# Patient Record
Sex: FEMALE | Race: WHITE | NOT HISPANIC OR LATINO
[De-identification: names, ages, dates, MRNs, and addresses within clinical notes are randomized per-mention and may not be internally consistent; named-entity substitution may affect disease eponyms.]

---

## 2017-01-08 ENCOUNTER — TRANSCRIPTION ENCOUNTER (OUTPATIENT)
Age: 82
End: 2017-01-08

## 2017-01-08 ENCOUNTER — RX RENEWAL (OUTPATIENT)
Age: 82
End: 2017-01-08

## 2017-02-06 ENCOUNTER — MEDICATION RENEWAL (OUTPATIENT)
Age: 82
End: 2017-02-06

## 2017-02-06 ENCOUNTER — RX RENEWAL (OUTPATIENT)
Age: 82
End: 2017-02-06

## 2017-02-06 ENCOUNTER — TRANSCRIPTION ENCOUNTER (OUTPATIENT)
Age: 82
End: 2017-02-06

## 2017-02-15 ENCOUNTER — APPOINTMENT (OUTPATIENT)
Dept: FAMILY MEDICINE | Facility: CLINIC | Age: 82
End: 2017-02-15

## 2017-02-15 VITALS — DIASTOLIC BLOOD PRESSURE: 74 MMHG | SYSTOLIC BLOOD PRESSURE: 120 MMHG | RESPIRATION RATE: 18 BRPM | HEART RATE: 74 BPM

## 2017-02-15 DIAGNOSIS — R94.31 ABNORMAL ELECTROCARDIOGRAM [ECG] [EKG]: ICD-10-CM

## 2017-02-15 DIAGNOSIS — M19.041 PRIMARY OSTEOARTHRITIS, RIGHT HAND: ICD-10-CM

## 2017-02-16 ENCOUNTER — APPOINTMENT (OUTPATIENT)
Dept: HEMATOLOGY ONCOLOGY | Facility: CLINIC | Age: 82
End: 2017-02-16

## 2017-02-16 VITALS
HEART RATE: 64 BPM | DIASTOLIC BLOOD PRESSURE: 60 MMHG | BODY MASS INDEX: 35.36 KG/M2 | TEMPERATURE: 98 F | SYSTOLIC BLOOD PRESSURE: 138 MMHG | WEIGHT: 206 LBS

## 2017-04-10 ENCOUNTER — RX RENEWAL (OUTPATIENT)
Age: 82
End: 2017-04-10

## 2017-05-16 ENCOUNTER — APPOINTMENT (OUTPATIENT)
Dept: FAMILY MEDICINE | Facility: CLINIC | Age: 82
End: 2017-05-16

## 2017-05-16 VITALS
SYSTOLIC BLOOD PRESSURE: 130 MMHG | HEART RATE: 68 BPM | OXYGEN SATURATION: 96 % | TEMPERATURE: 98.2 F | DIASTOLIC BLOOD PRESSURE: 60 MMHG

## 2017-05-16 DIAGNOSIS — I25.10 ATHEROSCLEROTIC HEART DISEASE OF NATIVE CORONARY ARTERY W/OUT ANGINA PECTORIS: ICD-10-CM

## 2017-05-16 DIAGNOSIS — F41.8 OTHER SPECIFIED ANXIETY DISORDERS: ICD-10-CM

## 2017-05-17 ENCOUNTER — APPOINTMENT (OUTPATIENT)
Dept: FAMILY MEDICINE | Facility: CLINIC | Age: 82
End: 2017-05-17

## 2017-05-19 LAB
ALBUMIN SERPL ELPH-MCNC: 4 G/DL
ALP BLD-CCNC: 101 U/L
ALT SERPL-CCNC: 11 U/L
ANION GAP SERPL CALC-SCNC: 15 MMOL/L
AST SERPL-CCNC: 17 U/L
BASOPHILS # BLD AUTO: 0.04 K/UL
BASOPHILS NFR BLD AUTO: 0.6 %
BILIRUB SERPL-MCNC: 0.4 MG/DL
BUN SERPL-MCNC: 26 MG/DL
CALCIUM SERPL-MCNC: 9.4 MG/DL
CHLORIDE SERPL-SCNC: 102 MMOL/L
CHOLEST SERPL-MCNC: 153 MG/DL
CHOLEST/HDLC SERPL: 3.3 RATIO
CO2 SERPL-SCNC: 26 MMOL/L
CREAT SERPL-MCNC: 1.19 MG/DL
EOSINOPHIL # BLD AUTO: 0.26 K/UL
EOSINOPHIL NFR BLD AUTO: 3.7 %
GLUCOSE SERPL-MCNC: 97 MG/DL
HCT VFR BLD CALC: 39.1 %
HDLC SERPL-MCNC: 46 MG/DL
HGB BLD-MCNC: 12.5 G/DL
IMM GRANULOCYTES NFR BLD AUTO: 0.1 %
LDLC SERPL CALC-MCNC: 64 MG/DL
LYMPHOCYTES # BLD AUTO: 1.86 K/UL
LYMPHOCYTES NFR BLD AUTO: 26.4 %
MAN DIFF?: NORMAL
MCHC RBC-ENTMCNC: 31.3 PG
MCHC RBC-ENTMCNC: 32 GM/DL
MCV RBC AUTO: 97.8 FL
MONOCYTES # BLD AUTO: 0.52 K/UL
MONOCYTES NFR BLD AUTO: 7.4 %
NEUTROPHILS # BLD AUTO: 4.36 K/UL
NEUTROPHILS NFR BLD AUTO: 61.8 %
PLATELET # BLD AUTO: 219 K/UL
POTASSIUM SERPL-SCNC: 4.8 MMOL/L
PROT SERPL-MCNC: 7 G/DL
RBC # BLD: 4 M/UL
RBC # FLD: 12.7 %
SODIUM SERPL-SCNC: 143 MMOL/L
TRIGL SERPL-MCNC: 215 MG/DL
TSH SERPL-ACNC: 1.59 UIU/ML
WBC # FLD AUTO: 7.05 K/UL

## 2017-07-05 ENCOUNTER — APPOINTMENT (OUTPATIENT)
Dept: ORTHOPEDIC SURGERY | Facility: CLINIC | Age: 82
End: 2017-07-05

## 2017-07-05 VITALS
BODY MASS INDEX: 37.9 KG/M2 | HEART RATE: 73 BPM | HEIGHT: 64 IN | DIASTOLIC BLOOD PRESSURE: 67 MMHG | WEIGHT: 222 LBS | SYSTOLIC BLOOD PRESSURE: 109 MMHG

## 2017-07-05 RX ORDER — HYALURONATE SOD, CROSS-LINKED 30 MG/3 ML
30 SYRINGE (ML) INTRAARTICULAR
Qty: 1 | Refills: 0 | Status: ACTIVE | OUTPATIENT
Start: 2017-07-05

## 2017-07-11 ENCOUNTER — CHART COPY (OUTPATIENT)
Age: 82
End: 2017-07-11

## 2017-07-11 RX ORDER — HYALURONATE SOD, CROSS-LINKED 30 MG/3 ML
30 SYRINGE (ML) INTRAARTICULAR
Qty: 1 | Refills: 0 | Status: ACTIVE | COMMUNITY
Start: 2017-07-11 | End: 1900-01-01

## 2017-07-24 ENCOUNTER — APPOINTMENT (OUTPATIENT)
Dept: ORTHOPEDIC SURGERY | Facility: CLINIC | Age: 82
End: 2017-07-24

## 2017-07-24 VITALS
DIASTOLIC BLOOD PRESSURE: 71 MMHG | WEIGHT: 224 LBS | SYSTOLIC BLOOD PRESSURE: 116 MMHG | BODY MASS INDEX: 38.24 KG/M2 | HEIGHT: 64 IN | HEART RATE: 82 BPM

## 2017-07-24 DIAGNOSIS — M17.12 UNILATERAL PRIMARY OSTEOARTHRITIS, LEFT KNEE: ICD-10-CM

## 2017-07-27 ENCOUNTER — RX RENEWAL (OUTPATIENT)
Age: 82
End: 2017-07-27

## 2017-07-27 RX ORDER — HYALURONATE SOD, CROSS-LINKED 30 MG/3 ML
30 SYRINGE (ML) INTRAARTICULAR
Refills: 0 | Status: COMPLETED | OUTPATIENT
Start: 2017-07-27

## 2017-07-27 RX ADMIN — Medication 0 MG/3ML: at 00:00

## 2017-08-04 ENCOUNTER — RESULT REVIEW (OUTPATIENT)
Age: 82
End: 2017-08-04

## 2017-08-10 ENCOUNTER — APPOINTMENT (OUTPATIENT)
Dept: HEMATOLOGY ONCOLOGY | Facility: CLINIC | Age: 82
End: 2017-08-10
Payer: MEDICARE

## 2017-08-10 VITALS
TEMPERATURE: 98.5 F | DIASTOLIC BLOOD PRESSURE: 60 MMHG | BODY MASS INDEX: 38.45 KG/M2 | SYSTOLIC BLOOD PRESSURE: 104 MMHG | WEIGHT: 224 LBS | HEART RATE: 68 BPM

## 2017-08-10 DIAGNOSIS — Z92.89 PERSONAL HISTORY OF OTHER MEDICAL TREATMENT: ICD-10-CM

## 2017-08-10 DIAGNOSIS — Z98.890 OTHER SPECIFIED POSTPROCEDURAL STATES: ICD-10-CM

## 2017-08-10 LAB
BASOPHILS # BLD AUTO: 0.05 K/UL
BASOPHILS NFR BLD AUTO: 0.6 %
CREAT SERPL-MCNC: 1.33 MG/DL
EOSINOPHIL # BLD AUTO: 0.37 K/UL
EOSINOPHIL NFR BLD AUTO: 4.3 %
FERRITIN SERPL-MCNC: 92 NG/ML
HCT VFR BLD CALC: 39.6 %
HGB BLD-MCNC: 12.8 G/DL
IMM GRANULOCYTES NFR BLD AUTO: 0.2 %
IRON SATN MFR SERPL: 32 %
IRON SERPL-MCNC: 84 UG/DL
LYMPHOCYTES # BLD AUTO: 1.86 K/UL
LYMPHOCYTES NFR BLD AUTO: 21.8 %
MAN DIFF?: NORMAL
MCHC RBC-ENTMCNC: 30.6 PG
MCHC RBC-ENTMCNC: 32.3 GM/DL
MCV RBC AUTO: 94.7 FL
MONOCYTES # BLD AUTO: 0.57 K/UL
MONOCYTES NFR BLD AUTO: 6.7 %
NEUTROPHILS # BLD AUTO: 5.67 K/UL
NEUTROPHILS NFR BLD AUTO: 66.4 %
PLATELET # BLD AUTO: 287 K/UL
RBC # BLD: 4.18 M/UL
RBC # FLD: 12.4 %
TIBC SERPL-MCNC: 264 UG/DL
UIBC SERPL-MCNC: 180 UG/DL
WBC # FLD AUTO: 8.54 K/UL

## 2017-08-10 PROCEDURE — 99213 OFFICE O/P EST LOW 20 MIN: CPT

## 2017-08-15 ENCOUNTER — APPOINTMENT (OUTPATIENT)
Dept: FAMILY MEDICINE | Facility: CLINIC | Age: 82
End: 2017-08-15
Payer: MEDICARE

## 2017-08-15 VITALS
SYSTOLIC BLOOD PRESSURE: 118 MMHG | WEIGHT: 224 LBS | HEIGHT: 64 IN | HEART RATE: 76 BPM | RESPIRATION RATE: 20 BRPM | BODY MASS INDEX: 38.24 KG/M2 | DIASTOLIC BLOOD PRESSURE: 68 MMHG

## 2017-08-15 PROCEDURE — 36415 COLL VENOUS BLD VENIPUNCTURE: CPT

## 2017-08-15 PROCEDURE — 99213 OFFICE O/P EST LOW 20 MIN: CPT | Mod: 25

## 2017-08-16 ENCOUNTER — APPOINTMENT (OUTPATIENT)
Dept: FAMILY MEDICINE | Facility: CLINIC | Age: 82
End: 2017-08-16

## 2017-08-20 LAB
ALBUMIN SERPL ELPH-MCNC: 4.4 G/DL
ALP BLD-CCNC: 109 U/L
ALT SERPL-CCNC: 7 U/L
ANION GAP SERPL CALC-SCNC: 21 MMOL/L
AST SERPL-CCNC: 14 U/L
BASOPHILS # BLD AUTO: 0.03 K/UL
BASOPHILS NFR BLD AUTO: 0.4 %
BILIRUB SERPL-MCNC: 0.6 MG/DL
BUN SERPL-MCNC: 25 MG/DL
CALCIUM SERPL-MCNC: 9.7 MG/DL
CHLORIDE SERPL-SCNC: 102 MMOL/L
CHOLEST SERPL-MCNC: 152 MG/DL
CHOLEST/HDLC SERPL: 3.3 RATIO
CO2 SERPL-SCNC: 18 MMOL/L
CREAT SERPL-MCNC: 1.23 MG/DL
EOSINOPHIL # BLD AUTO: 0.2 K/UL
EOSINOPHIL NFR BLD AUTO: 3 %
GLUCOSE SERPL-MCNC: 171 MG/DL
HBA1C MFR BLD HPLC: 5.7 %
HCT VFR BLD CALC: 38.9 %
HDLC SERPL-MCNC: 46 MG/DL
HGB BLD-MCNC: 12.6 G/DL
IMM GRANULOCYTES NFR BLD AUTO: 0.3 %
LDLC SERPL CALC-MCNC: 55 MG/DL
LYMPHOCYTES # BLD AUTO: 1.42 K/UL
LYMPHOCYTES NFR BLD AUTO: 21 %
MAN DIFF?: NORMAL
MCHC RBC-ENTMCNC: 30.8 PG
MCHC RBC-ENTMCNC: 32.4 GM/DL
MCV RBC AUTO: 95.1 FL
MONOCYTES # BLD AUTO: 0.41 K/UL
MONOCYTES NFR BLD AUTO: 6.1 %
NEUTROPHILS # BLD AUTO: 4.69 K/UL
NEUTROPHILS NFR BLD AUTO: 69.2 %
PLATELET # BLD AUTO: 264 K/UL
POTASSIUM SERPL-SCNC: 4.1 MMOL/L
PROT SERPL-MCNC: 7.1 G/DL
RBC # BLD: 4.09 M/UL
RBC # FLD: 12.7 %
SODIUM SERPL-SCNC: 141 MMOL/L
T4 FREE SERPL-MCNC: 1 NG/DL
TRIGL SERPL-MCNC: 253 MG/DL
TSH SERPL-ACNC: 1.98 UIU/ML
WBC # FLD AUTO: 6.77 K/UL

## 2017-09-25 ENCOUNTER — RX RENEWAL (OUTPATIENT)
Age: 82
End: 2017-09-25

## 2017-09-25 ENCOUNTER — INPATIENT (INPATIENT)
Facility: HOSPITAL | Age: 82
LOS: 4 days | Discharge: HOME | DRG: 439 | End: 2017-09-30
Attending: HOSPITALIST | Admitting: INTERNAL MEDICINE
Payer: COMMERCIAL

## 2017-09-25 DIAGNOSIS — E78.5 HYPERLIPIDEMIA, UNSPECIFIED: ICD-10-CM

## 2017-09-25 DIAGNOSIS — F41.9 ANXIETY DISORDER, UNSPECIFIED: ICD-10-CM

## 2017-09-25 DIAGNOSIS — Z88.0 ALLERGY STATUS TO PENICILLIN: ICD-10-CM

## 2017-09-25 DIAGNOSIS — K85.90 ACUTE PANCREATITIS WITHOUT NECROSIS OR INFECTION, UNSPECIFIED: ICD-10-CM

## 2017-09-25 DIAGNOSIS — Z79.82 LONG TERM (CURRENT) USE OF ASPIRIN: ICD-10-CM

## 2017-09-25 DIAGNOSIS — I10 ESSENTIAL (PRIMARY) HYPERTENSION: ICD-10-CM

## 2017-09-25 DIAGNOSIS — K85.10 BILIARY ACUTE PANCREATITIS WITHOUT NECROSIS OR INFECTION: ICD-10-CM

## 2017-09-25 DIAGNOSIS — E03.9 HYPOTHYROIDISM, UNSPECIFIED: ICD-10-CM

## 2017-09-25 DIAGNOSIS — N17.9 ACUTE KIDNEY FAILURE, UNSPECIFIED: ICD-10-CM

## 2017-09-25 DIAGNOSIS — E66.9 OBESITY, UNSPECIFIED: ICD-10-CM

## 2017-09-25 PROCEDURE — 99223 1ST HOSP IP/OBS HIGH 75: CPT

## 2017-09-25 PROCEDURE — 71010: CPT | Mod: 26

## 2017-09-25 PROCEDURE — 76705 ECHO EXAM OF ABDOMEN: CPT | Mod: 26

## 2017-09-25 PROCEDURE — 93010 ELECTROCARDIOGRAM REPORT: CPT

## 2017-09-26 PROCEDURE — 74176 CT ABD & PELVIS W/O CONTRAST: CPT | Mod: 26

## 2017-09-27 PROCEDURE — 99223 1ST HOSP IP/OBS HIGH 75: CPT

## 2017-09-27 PROCEDURE — 78226 HEPATOBILIARY SYSTEM IMAGING: CPT | Mod: 26

## 2017-09-27 PROCEDURE — 93971 EXTREMITY STUDY: CPT | Mod: 26,LT

## 2017-09-27 PROCEDURE — 99233 SBSQ HOSP IP/OBS HIGH 50: CPT

## 2017-09-28 PROCEDURE — 74176 CT ABD & PELVIS W/O CONTRAST: CPT | Mod: 26

## 2017-09-28 PROCEDURE — 99233 SBSQ HOSP IP/OBS HIGH 50: CPT

## 2017-09-29 PROCEDURE — 99232 SBSQ HOSP IP/OBS MODERATE 35: CPT | Mod: GC

## 2017-09-29 PROCEDURE — 99232 SBSQ HOSP IP/OBS MODERATE 35: CPT

## 2017-09-30 PROCEDURE — 99232 SBSQ HOSP IP/OBS MODERATE 35: CPT

## 2017-09-30 PROCEDURE — 99239 HOSP IP/OBS DSCHRG MGMT >30: CPT

## 2017-10-02 DIAGNOSIS — G47.00 INSOMNIA, UNSPECIFIED: ICD-10-CM

## 2017-10-02 RX ORDER — TRAMADOL HYDROCHLORIDE 50 MG/1
50 TABLET, COATED ORAL
Qty: 120 | Refills: 0 | Status: DISCONTINUED | COMMUNITY
Start: 2017-02-15 | End: 2017-10-02

## 2017-10-04 ENCOUNTER — APPOINTMENT (OUTPATIENT)
Dept: SURGERY | Facility: CLINIC | Age: 82
End: 2017-10-04
Payer: MEDICARE

## 2017-10-04 VITALS — HEIGHT: 65 IN | BODY MASS INDEX: 31.65 KG/M2 | WEIGHT: 190 LBS

## 2017-10-04 DIAGNOSIS — Z86.39 PERSONAL HISTORY OF OTHER ENDOCRINE, NUTRITIONAL AND METABOLIC DISEASE: ICD-10-CM

## 2017-10-04 DIAGNOSIS — K85.10 BILIARY ACUTE PANCREATITIS WITHOUT NECROSIS OR INFECTION: ICD-10-CM

## 2017-10-04 DIAGNOSIS — Z82.61 FAMILY HISTORY OF ARTHRITIS: ICD-10-CM

## 2017-10-04 DIAGNOSIS — Z86.79 PERSONAL HISTORY OF OTHER DISEASES OF THE CIRCULATORY SYSTEM: ICD-10-CM

## 2017-10-04 PROCEDURE — 99214 OFFICE O/P EST MOD 30 MIN: CPT

## 2017-10-19 PROCEDURE — 83735 ASSAY OF MAGNESIUM: CPT

## 2017-10-19 PROCEDURE — 84478 ASSAY OF TRIGLYCERIDES: CPT

## 2017-10-19 PROCEDURE — 78226 HEPATOBILIARY SYSTEM IMAGING: CPT

## 2017-10-19 PROCEDURE — 85610 PROTHROMBIN TIME: CPT

## 2017-10-19 PROCEDURE — 82550 ASSAY OF CK (CPK): CPT

## 2017-10-19 PROCEDURE — 83690 ASSAY OF LIPASE: CPT

## 2017-10-19 PROCEDURE — 71045 X-RAY EXAM CHEST 1 VIEW: CPT

## 2017-10-19 PROCEDURE — 74176 CT ABD & PELVIS W/O CONTRAST: CPT

## 2017-10-19 PROCEDURE — 85025 COMPLETE CBC W/AUTO DIFF WBC: CPT

## 2017-10-19 PROCEDURE — 93005 ELECTROCARDIOGRAM TRACING: CPT

## 2017-10-19 PROCEDURE — 97161 PT EVAL LOW COMPLEX 20 MIN: CPT

## 2017-10-19 PROCEDURE — 85730 THROMBOPLASTIN TIME PARTIAL: CPT

## 2017-10-19 PROCEDURE — 82553 CREATINE MB FRACTION: CPT

## 2017-10-19 PROCEDURE — 84484 ASSAY OF TROPONIN QUANT: CPT

## 2017-10-19 PROCEDURE — 99285 EMERGENCY DEPT VISIT HI MDM: CPT | Mod: 25

## 2017-10-19 PROCEDURE — 93971 EXTREMITY STUDY: CPT

## 2017-10-19 PROCEDURE — 85027 COMPLETE CBC AUTOMATED: CPT

## 2017-10-19 PROCEDURE — 36415 COLL VENOUS BLD VENIPUNCTURE: CPT

## 2017-10-19 PROCEDURE — 80048 BASIC METABOLIC PNL TOTAL CA: CPT

## 2017-10-19 PROCEDURE — 76705 ECHO EXAM OF ABDOMEN: CPT

## 2017-10-19 PROCEDURE — 82150 ASSAY OF AMYLASE: CPT

## 2017-10-19 PROCEDURE — 80053 COMPREHEN METABOLIC PANEL: CPT

## 2017-10-19 PROCEDURE — 80076 HEPATIC FUNCTION PANEL: CPT

## 2017-10-19 PROCEDURE — A9537: CPT

## 2017-11-09 ENCOUNTER — APPOINTMENT (OUTPATIENT)
Dept: FAMILY MEDICINE | Facility: CLINIC | Age: 82
End: 2017-11-09

## 2017-11-09 ENCOUNTER — RX RENEWAL (OUTPATIENT)
Age: 82
End: 2017-11-09

## 2017-11-13 ENCOUNTER — APPOINTMENT (OUTPATIENT)
Dept: FAMILY MEDICINE | Facility: CLINIC | Age: 82
End: 2017-11-13
Payer: MEDICARE

## 2017-11-13 ENCOUNTER — APPOINTMENT (OUTPATIENT)
Dept: FAMILY MEDICINE | Facility: CLINIC | Age: 82
End: 2017-11-13

## 2017-11-13 VITALS
SYSTOLIC BLOOD PRESSURE: 125 MMHG | WEIGHT: 223 LBS | DIASTOLIC BLOOD PRESSURE: 75 MMHG | HEIGHT: 65 IN | BODY MASS INDEX: 37.15 KG/M2 | RESPIRATION RATE: 20 BRPM | HEART RATE: 78 BPM

## 2017-11-13 DIAGNOSIS — M12.9 ARTHROPATHY, UNSPECIFIED: ICD-10-CM

## 2017-11-13 PROCEDURE — 90688 IIV4 VACCINE SPLT 0.5 ML IM: CPT

## 2017-11-13 PROCEDURE — 36415 COLL VENOUS BLD VENIPUNCTURE: CPT

## 2017-11-13 PROCEDURE — 99214 OFFICE O/P EST MOD 30 MIN: CPT | Mod: 25

## 2017-11-13 PROCEDURE — G0008: CPT

## 2017-11-26 LAB
ALBUMIN SERPL ELPH-MCNC: 4.2 G/DL
ALP BLD-CCNC: 122 U/L
ALT SERPL-CCNC: 11 U/L
ANION GAP SERPL CALC-SCNC: 20 MMOL/L
AST SERPL-CCNC: 19 U/L
BASOPHILS # BLD AUTO: 0.05 K/UL
BASOPHILS NFR BLD AUTO: 0.6 %
BILIRUB SERPL-MCNC: 0.5 MG/DL
BUN SERPL-MCNC: 19 MG/DL
CALCIUM SERPL-MCNC: 10 MG/DL
CHLORIDE SERPL-SCNC: 103 MMOL/L
CHOLEST SERPL-MCNC: 158 MG/DL
CHOLEST/HDLC SERPL: 3.4 RATIO
CO2 SERPL-SCNC: 22 MMOL/L
CREAT SERPL-MCNC: 1.2 MG/DL
EOSINOPHIL # BLD AUTO: 0.3 K/UL
EOSINOPHIL NFR BLD AUTO: 3.6 %
GLUCOSE SERPL-MCNC: 94 MG/DL
HBA1C MFR BLD HPLC: 5.6 %
HCT VFR BLD CALC: 39.2 %
HDLC SERPL-MCNC: 47 MG/DL
HGB BLD-MCNC: 12.3 G/DL
IMM GRANULOCYTES NFR BLD AUTO: 0.4 %
LDLC SERPL CALC-MCNC: 65 MG/DL
LYMPHOCYTES # BLD AUTO: 2 K/UL
LYMPHOCYTES NFR BLD AUTO: 24.2 %
MAN DIFF?: NORMAL
MCHC RBC-ENTMCNC: 30.2 PG
MCHC RBC-ENTMCNC: 31.4 GM/DL
MCV RBC AUTO: 96.3 FL
MONOCYTES # BLD AUTO: 0.56 K/UL
MONOCYTES NFR BLD AUTO: 6.8 %
NEUTROPHILS # BLD AUTO: 5.32 K/UL
NEUTROPHILS NFR BLD AUTO: 64.4 %
PLATELET # BLD AUTO: 275 K/UL
POTASSIUM SERPL-SCNC: 5 MMOL/L
PROT SERPL-MCNC: 7.6 G/DL
RBC # BLD: 4.07 M/UL
RBC # FLD: 12.8 %
SODIUM SERPL-SCNC: 145 MMOL/L
T4 FREE SERPL-MCNC: 1.1 NG/DL
TRIGL SERPL-MCNC: 228 MG/DL
TSH SERPL-ACNC: 2.19 UIU/ML
WBC # FLD AUTO: 8.26 K/UL

## 2017-12-11 ENCOUNTER — RX RENEWAL (OUTPATIENT)
Age: 82
End: 2017-12-11

## 2017-12-18 ENCOUNTER — RX RENEWAL (OUTPATIENT)
Age: 82
End: 2017-12-18

## 2018-02-06 ENCOUNTER — APPOINTMENT (OUTPATIENT)
Dept: HEMATOLOGY ONCOLOGY | Facility: CLINIC | Age: 83
End: 2018-02-06
Payer: MEDICARE

## 2018-02-06 VITALS
HEART RATE: 68 BPM | TEMPERATURE: 98 F | DIASTOLIC BLOOD PRESSURE: 60 MMHG | BODY MASS INDEX: 36.94 KG/M2 | WEIGHT: 222 LBS | SYSTOLIC BLOOD PRESSURE: 128 MMHG

## 2018-02-06 DIAGNOSIS — R76.8 OTHER SPECIFIED ABNORMAL IMMUNOLOGICAL FINDINGS IN SERUM: ICD-10-CM

## 2018-02-06 PROCEDURE — 99214 OFFICE O/P EST MOD 30 MIN: CPT

## 2018-02-15 ENCOUNTER — APPOINTMENT (OUTPATIENT)
Dept: FAMILY MEDICINE | Facility: CLINIC | Age: 83
End: 2018-02-15
Payer: MEDICARE

## 2018-02-15 VITALS
HEART RATE: 68 BPM | SYSTOLIC BLOOD PRESSURE: 124 MMHG | WEIGHT: 224 LBS | DIASTOLIC BLOOD PRESSURE: 70 MMHG | BODY MASS INDEX: 37.32 KG/M2 | RESPIRATION RATE: 20 BRPM | HEIGHT: 65 IN

## 2018-02-15 PROCEDURE — 36415 COLL VENOUS BLD VENIPUNCTURE: CPT

## 2018-02-15 PROCEDURE — 99214 OFFICE O/P EST MOD 30 MIN: CPT | Mod: 25

## 2018-02-16 LAB
ALBUMIN SERPL ELPH-MCNC: 4.1 G/DL
ALP BLD-CCNC: 121 U/L
ALT SERPL-CCNC: 5 U/L
ANION GAP SERPL CALC-SCNC: 18 MMOL/L
AST SERPL-CCNC: 14 U/L
BASOPHILS # BLD AUTO: 0.02 K/UL
BASOPHILS NFR BLD AUTO: 0.3 %
BILIRUB SERPL-MCNC: 0.5 MG/DL
BUN SERPL-MCNC: 21 MG/DL
CALCIUM SERPL-MCNC: 9.6 MG/DL
CHLORIDE SERPL-SCNC: 102 MMOL/L
CHOLEST SERPL-MCNC: 142 MG/DL
CHOLEST/HDLC SERPL: 2.7 RATIO
CO2 SERPL-SCNC: 24 MMOL/L
CREAT SERPL-MCNC: 1.23 MG/DL
EOSINOPHIL # BLD AUTO: 0.25 K/UL
EOSINOPHIL NFR BLD AUTO: 3.2 %
GLUCOSE SERPL-MCNC: 132 MG/DL
HBA1C MFR BLD HPLC: 5.8 %
HCT VFR BLD CALC: 40 %
HDLC SERPL-MCNC: 52 MG/DL
HGB BLD-MCNC: 13.2 G/DL
IMM GRANULOCYTES NFR BLD AUTO: 0.1 %
LDLC SERPL CALC-MCNC: 52 MG/DL
LYMPHOCYTES # BLD AUTO: 1.79 K/UL
LYMPHOCYTES NFR BLD AUTO: 23.2 %
MAN DIFF?: NORMAL
MCHC RBC-ENTMCNC: 31.1 PG
MCHC RBC-ENTMCNC: 33 GM/DL
MCV RBC AUTO: 94.3 FL
MONOCYTES # BLD AUTO: 0.52 K/UL
MONOCYTES NFR BLD AUTO: 6.7 %
NEUTROPHILS # BLD AUTO: 5.14 K/UL
NEUTROPHILS NFR BLD AUTO: 66.5 %
PLATELET # BLD AUTO: 275 K/UL
POTASSIUM SERPL-SCNC: 3.9 MMOL/L
PROT SERPL-MCNC: 7.5 G/DL
RBC # BLD: 4.24 M/UL
RBC # FLD: 13.2 %
SODIUM SERPL-SCNC: 144 MMOL/L
T4 FREE SERPL-MCNC: 1 NG/DL
TRIGL SERPL-MCNC: 192 MG/DL
TSH SERPL-ACNC: 2.04 UIU/ML
WBC # FLD AUTO: 7.73 K/UL

## 2018-03-25 ENCOUNTER — RX RENEWAL (OUTPATIENT)
Age: 83
End: 2018-03-25

## 2018-03-29 ENCOUNTER — RX RENEWAL (OUTPATIENT)
Age: 83
End: 2018-03-29

## 2018-04-05 ENCOUNTER — RX RENEWAL (OUTPATIENT)
Age: 83
End: 2018-04-05

## 2018-04-23 ENCOUNTER — RX RENEWAL (OUTPATIENT)
Age: 83
End: 2018-04-23

## 2018-04-26 ENCOUNTER — APPOINTMENT (OUTPATIENT)
Dept: CARDIOLOGY | Facility: CLINIC | Age: 83
End: 2018-04-26
Payer: MEDICARE

## 2018-04-26 ENCOUNTER — NON-APPOINTMENT (OUTPATIENT)
Age: 83
End: 2018-04-26

## 2018-04-26 ENCOUNTER — RX RENEWAL (OUTPATIENT)
Age: 83
End: 2018-04-26

## 2018-04-26 VITALS
HEART RATE: 90 BPM | DIASTOLIC BLOOD PRESSURE: 67 MMHG | WEIGHT: 224 LBS | SYSTOLIC BLOOD PRESSURE: 107 MMHG | RESPIRATION RATE: 15 BRPM | HEIGHT: 65 IN | OXYGEN SATURATION: 94 % | BODY MASS INDEX: 37.32 KG/M2

## 2018-04-26 VITALS — SYSTOLIC BLOOD PRESSURE: 116 MMHG | DIASTOLIC BLOOD PRESSURE: 59 MMHG

## 2018-04-26 DIAGNOSIS — Z87.898 PERSONAL HISTORY OF OTHER SPECIFIED CONDITIONS: ICD-10-CM

## 2018-04-26 PROCEDURE — 99214 OFFICE O/P EST MOD 30 MIN: CPT

## 2018-04-26 PROCEDURE — 93000 ELECTROCARDIOGRAM COMPLETE: CPT

## 2018-04-26 RX ORDER — AMLODIPINE BESYLATE 10 MG/1
10 TABLET ORAL
Qty: 90 | Refills: 3 | Status: DISCONTINUED | COMMUNITY
Start: 2018-04-05 | End: 2018-04-26

## 2018-05-01 ENCOUNTER — RX RENEWAL (OUTPATIENT)
Age: 83
End: 2018-05-01

## 2018-05-17 ENCOUNTER — RX RENEWAL (OUTPATIENT)
Age: 83
End: 2018-05-17

## 2018-05-21 ENCOUNTER — APPOINTMENT (OUTPATIENT)
Dept: FAMILY MEDICINE | Facility: CLINIC | Age: 83
End: 2018-05-21
Payer: MEDICARE

## 2018-05-21 ENCOUNTER — LABORATORY RESULT (OUTPATIENT)
Age: 83
End: 2018-05-21

## 2018-05-21 VITALS
WEIGHT: 219 LBS | DIASTOLIC BLOOD PRESSURE: 70 MMHG | HEIGHT: 65 IN | HEART RATE: 78 BPM | RESPIRATION RATE: 20 BRPM | BODY MASS INDEX: 36.49 KG/M2 | SYSTOLIC BLOOD PRESSURE: 126 MMHG

## 2018-05-21 PROCEDURE — 99214 OFFICE O/P EST MOD 30 MIN: CPT | Mod: 25

## 2018-05-21 PROCEDURE — G0444 DEPRESSION SCREEN ANNUAL: CPT | Mod: 26

## 2018-05-21 PROCEDURE — 36415 COLL VENOUS BLD VENIPUNCTURE: CPT

## 2018-05-21 NOTE — ASSESSMENT
[FreeTextEntry1] : Stable exam with acceptable BP and no clinical evidence of thryoid pathology.  Note prob seborrheic keratosis L cheek, but advise Derm evaluation\par Lab profiles sent

## 2018-05-21 NOTE — COUNSELING
[Weight management counseling provided] : Weight management [Healthy eating counseling provided] : healthy eating [Activity counseling provided] : activity [None] : None [Good understanding] : Patient has a good understanding of lifestyle changes and the steps needed to achieve self management goals [ - Annual Depression Screening] : Annual Depression Screening

## 2018-05-21 NOTE — HEALTH RISK ASSESSMENT
[No falls in past year] : Patient reported no falls in the past year [0] : 2) Feeling down, depressed, or hopeless: Not at all (0) [Fully functional (bathing, dressing, toileting, transferring, walking, feeding)] : Fully functional (bathing, dressing, toileting, transferring, walking, feeding) [Fully functional (using the telephone, shopping, preparing meals, housekeeping, doing laundry, using] : Fully functional and needs no help or supervision to perform IADLs (using the telephone, shopping, preparing meals, housekeeping, doing laundry, using transportation, managing medications and managing finances) [Discussed at today's visit] : Advance Directives Discussed at today's visit [Designated Healthcare Proxy] : Designated healthcare proxy [Relationship: ___] : Relationship: [unfilled] [] : No [FreeTextEntry4] : has not decided on preferences

## 2018-05-21 NOTE — PHYSICAL EXAM
[No Acute Distress] : no acute distress [Supple] : supple [Thyroid Normal, No Nodules] : the thyroid was normal and there were no nodules present [No Respiratory Distress] : no respiratory distress  [Clear to Auscultation] : lungs were clear to auscultation bilaterally [No Accessory Muscle Use] : no accessory muscle use [Normal Rate] : normal rate  [Regular Rhythm] : with a regular rhythm [Normal S1, S2] : normal S1 and S2 [No Murmur] : no murmur heard [No Edema] : there was no peripheral edema [Soft] : abdomen soft [Non Tender] : non-tender [No Rash] : no rash [No Focal Deficits] : no focal deficits [Alert and Oriented x3] : oriented to person, place, and time [de-identified] : pigmented area L cheek

## 2018-05-21 NOTE — HISTORY OF PRESENT ILLNESS
[de-identified] : Presents for BP check, labs.  Does complain of increased arthritic pain edwina L knee; otherwise no new issues.  Reviewed diet - note small but desirable wt loss since last visit.

## 2018-05-22 LAB
25(OH)D3 SERPL-MCNC: 19.1 NG/ML
ALBUMIN SERPL ELPH-MCNC: 4.6 G/DL
ALP BLD-CCNC: 124 U/L
ALT SERPL-CCNC: 8 U/L
ANION GAP SERPL CALC-SCNC: 19 MMOL/L
AST SERPL-CCNC: 16 U/L
BASOPHILS # BLD AUTO: 0 K/UL
BASOPHILS NFR BLD AUTO: 0 %
BILIRUB SERPL-MCNC: 0.6 MG/DL
BUN SERPL-MCNC: 20 MG/DL
CALCIUM SERPL-MCNC: 10 MG/DL
CHLORIDE SERPL-SCNC: 101 MMOL/L
CHOLEST SERPL-MCNC: 145 MG/DL
CHOLEST/HDLC SERPL: 2.6 RATIO
CO2 SERPL-SCNC: 23 MMOL/L
CREAT SERPL-MCNC: 1.27 MG/DL
EOSINOPHIL # BLD AUTO: 0.28 K/UL
EOSINOPHIL NFR BLD AUTO: 4 %
FOLATE SERPL-MCNC: 6 NG/ML
GLUCOSE SERPL-MCNC: 131 MG/DL
HBA1C MFR BLD HPLC: 5.7 %
HCT VFR BLD CALC: 44.5 %
HDLC SERPL-MCNC: 55 MG/DL
HGB BLD-MCNC: 14.5 G/DL
LDLC SERPL CALC-MCNC: 52 MG/DL
LYMPHOCYTES # BLD AUTO: 1.52 K/UL
LYMPHOCYTES NFR BLD AUTO: 22 %
MAN DIFF?: NORMAL
MCHC RBC-ENTMCNC: 30 PG
MCHC RBC-ENTMCNC: 32.6 GM/DL
MCV RBC AUTO: 92.1 FL
MONOCYTES # BLD AUTO: 0.35 K/UL
MONOCYTES NFR BLD AUTO: 5 %
NEUTROPHILS # BLD AUTO: 4.77 K/UL
NEUTROPHILS NFR BLD AUTO: 69 %
PLATELET # BLD AUTO: 245 K/UL
POTASSIUM SERPL-SCNC: 3.9 MMOL/L
PROT SERPL-MCNC: 7.8 G/DL
RBC # BLD: 4.83 M/UL
RBC # FLD: 13.1 %
SODIUM SERPL-SCNC: 143 MMOL/L
T4 FREE SERPL-MCNC: 1.2 NG/DL
TRIGL SERPL-MCNC: 189 MG/DL
TSH SERPL-ACNC: 2.95 UIU/ML
VIT B12 SERPL-MCNC: 383 PG/ML
WBC # FLD AUTO: 6.91 K/UL

## 2018-08-03 LAB
BASOPHILS # BLD AUTO: 0.03 K/UL
BASOPHILS NFR BLD AUTO: 0.4 %
EOSINOPHIL # BLD AUTO: 0.27 K/UL
EOSINOPHIL NFR BLD AUTO: 3.4 %
HCT VFR BLD CALC: 41.3 %
HGB BLD-MCNC: 13.2 G/DL
IMM GRANULOCYTES NFR BLD AUTO: 0.2 %
LYMPHOCYTES # BLD AUTO: 1.48 K/UL
LYMPHOCYTES NFR BLD AUTO: 18.4 %
MAN DIFF?: NORMAL
MCHC RBC-ENTMCNC: 29.7 PG
MCHC RBC-ENTMCNC: 32 GM/DL
MCV RBC AUTO: 92.8 FL
MONOCYTES # BLD AUTO: 0.46 K/UL
MONOCYTES NFR BLD AUTO: 5.7 %
NEUTROPHILS # BLD AUTO: 5.79 K/UL
NEUTROPHILS NFR BLD AUTO: 71.9 %
PLATELET # BLD AUTO: 306 K/UL
RBC # BLD: 4.45 M/UL
RBC # FLD: 13.5 %
WBC # FLD AUTO: 8.05 K/UL

## 2018-08-04 LAB
CREAT SERPL-MCNC: 1.63 MG/DL
FERRITIN SERPL-MCNC: 71 NG/ML
IRON SATN MFR SERPL: 26 %
IRON SERPL-MCNC: 75 UG/DL
M PROTEIN SPEC IFE-MCNC: NORMAL
TIBC SERPL-MCNC: 294 UG/DL
UIBC SERPL-MCNC: 219 UG/DL

## 2018-08-07 ENCOUNTER — APPOINTMENT (OUTPATIENT)
Dept: HEMATOLOGY ONCOLOGY | Facility: CLINIC | Age: 83
End: 2018-08-07
Payer: MEDICARE

## 2018-08-07 VITALS
BODY MASS INDEX: 36.11 KG/M2 | HEART RATE: 68 BPM | WEIGHT: 217 LBS | DIASTOLIC BLOOD PRESSURE: 54 MMHG | SYSTOLIC BLOOD PRESSURE: 114 MMHG

## 2018-08-07 LAB
DEPRECATED KAPPA LC FREE/LAMBDA SER: 1.19 RATIO
DEPRECATED KAPPA LC FREE/LAMBDA SER: 1.19 RATIO
IGA SER QL IEP: 530 MG/DL
IGG SER QL IEP: 1137 MG/DL
IGM SER QL IEP: 96 MG/DL
KAPPA LC CSF-MCNC: 2.91 MG/DL
KAPPA LC CSF-MCNC: 2.91 MG/DL
KAPPA LC SERPL-MCNC: 3.46 MG/DL
KAPPA LC SERPL-MCNC: 3.46 MG/DL

## 2018-08-07 PROCEDURE — 99214 OFFICE O/P EST MOD 30 MIN: CPT

## 2018-08-20 ENCOUNTER — APPOINTMENT (OUTPATIENT)
Dept: FAMILY MEDICINE | Facility: CLINIC | Age: 83
End: 2018-08-20
Payer: MEDICARE

## 2018-08-20 VITALS
BODY MASS INDEX: 35.65 KG/M2 | HEIGHT: 65 IN | RESPIRATION RATE: 20 BRPM | DIASTOLIC BLOOD PRESSURE: 65 MMHG | WEIGHT: 214 LBS | HEART RATE: 68 BPM | SYSTOLIC BLOOD PRESSURE: 115 MMHG

## 2018-08-20 PROCEDURE — 99214 OFFICE O/P EST MOD 30 MIN: CPT | Mod: 25

## 2018-08-20 PROCEDURE — 36415 COLL VENOUS BLD VENIPUNCTURE: CPT

## 2018-08-20 NOTE — PHYSICAL EXAM
[No Acute Distress] : no acute distress [Supple] : supple [Thyroid Normal, No Nodules] : the thyroid was normal and there were no nodules present [No Respiratory Distress] : no respiratory distress  [Clear to Auscultation] : lungs were clear to auscultation bilaterally [No Accessory Muscle Use] : no accessory muscle use [Normal Rate] : normal rate  [Regular Rhythm] : with a regular rhythm [Normal S1, S2] : normal S1 and S2 [No Murmur] : no murmur heard [No Edema] : there was no peripheral edema [Soft] : abdomen soft [Non Tender] : non-tender [No Focal Deficits] : no focal deficits [Alert and Oriented x3] : oriented to person, place, and time

## 2018-08-20 NOTE — ASSESSMENT
[FreeTextEntry1] : Hemodynamically stable with acceptable BP; no clinical evidence of thyroid pathology\par Neuro exam unremarkable and no obvious cognitive impairment at this encounter - will observe; Neurology referral pending progression of these complaints\par Lab profiles sent

## 2018-08-20 NOTE — HISTORY OF PRESENT ILLNESS
[de-identified] : Presents for BP check, labs, and general follow-up.  Reviewed recent visit with Dr. White.  States feels generally well; does feel has had some "memory issues" recently.

## 2018-08-21 LAB
ALBUMIN SERPL ELPH-MCNC: 4.2 G/DL
ALP BLD-CCNC: 118 U/L
ALT SERPL-CCNC: 6 U/L
ANION GAP SERPL CALC-SCNC: 21 MMOL/L
AST SERPL-CCNC: 12 U/L
BILIRUB SERPL-MCNC: 0.5 MG/DL
BUN SERPL-MCNC: 21 MG/DL
CALCIUM SERPL-MCNC: 9.2 MG/DL
CHLORIDE SERPL-SCNC: 102 MMOL/L
CHOLEST SERPL-MCNC: 131 MG/DL
CHOLEST/HDLC SERPL: 2.9 RATIO
CO2 SERPL-SCNC: 20 MMOL/L
CREAT SERPL-MCNC: 1.46 MG/DL
FOLATE SERPL-MCNC: 3.6 NG/ML
GLUCOSE SERPL-MCNC: 113 MG/DL
HBA1C MFR BLD HPLC: 5.9 %
HDLC SERPL-MCNC: 45 MG/DL
LDLC SERPL CALC-MCNC: 50 MG/DL
POTASSIUM SERPL-SCNC: 4.1 MMOL/L
PROT SERPL-MCNC: 6.8 G/DL
SODIUM SERPL-SCNC: 143 MMOL/L
T3FREE SERPL-MCNC: 1.95 PG/ML
T4 FREE SERPL-MCNC: 1 NG/DL
TRIGL SERPL-MCNC: 178 MG/DL
TSH SERPL-ACNC: 2.83 UIU/ML
VIT B12 SERPL-MCNC: 336 PG/ML

## 2018-09-27 ENCOUNTER — RX RENEWAL (OUTPATIENT)
Age: 83
End: 2018-09-27

## 2018-11-09 ENCOUNTER — RX RENEWAL (OUTPATIENT)
Age: 83
End: 2018-11-09

## 2018-11-20 ENCOUNTER — APPOINTMENT (OUTPATIENT)
Dept: FAMILY MEDICINE | Facility: CLINIC | Age: 83
End: 2018-11-20
Payer: MEDICARE

## 2018-11-20 VITALS
SYSTOLIC BLOOD PRESSURE: 128 MMHG | HEART RATE: 68 BPM | RESPIRATION RATE: 20 BRPM | DIASTOLIC BLOOD PRESSURE: 68 MMHG | HEIGHT: 65 IN | BODY MASS INDEX: 35.32 KG/M2 | WEIGHT: 212 LBS

## 2018-11-20 PROCEDURE — G0008: CPT

## 2018-11-20 PROCEDURE — G0009: CPT

## 2018-11-20 PROCEDURE — 90670 PCV13 VACCINE IM: CPT

## 2018-11-20 PROCEDURE — 99214 OFFICE O/P EST MOD 30 MIN: CPT | Mod: 25

## 2018-11-20 PROCEDURE — 36415 COLL VENOUS BLD VENIPUNCTURE: CPT

## 2018-11-20 PROCEDURE — 90686 IIV4 VACC NO PRSV 0.5 ML IM: CPT

## 2018-11-20 NOTE — ASSESSMENT
[FreeTextEntry1] : Hemodynamically stable with acceptable BP\par No clinical evidence of thyroid pathology\par Lab profiles sent\par Flu vaccine given L deltoid\par Prevnar 13 given R deltoid

## 2018-11-20 NOTE — HISTORY OF PRESENT ILLNESS
[de-identified] : Presents for BP check, labs, and general follow-up.  States feeling generally well.  Reviewed immunizations - due for flu vaccine and Prevnar 13 - pt in agreement

## 2018-11-21 LAB
ALBUMIN SERPL ELPH-MCNC: 4.1 G/DL
ALP BLD-CCNC: 115 U/L
ALT SERPL-CCNC: <5 U/L
ANION GAP SERPL CALC-SCNC: 11 MMOL/L
AST SERPL-CCNC: 15 U/L
BASOPHILS # BLD AUTO: 0.03 K/UL
BASOPHILS NFR BLD AUTO: 0.5 %
BILIRUB SERPL-MCNC: 0.7 MG/DL
BUN SERPL-MCNC: 18 MG/DL
CALCIUM SERPL-MCNC: 9.3 MG/DL
CHLORIDE SERPL-SCNC: 104 MMOL/L
CHOLEST SERPL-MCNC: 144 MG/DL
CHOLEST/HDLC SERPL: 3.3 RATIO
CO2 SERPL-SCNC: 27 MMOL/L
CREAT SERPL-MCNC: 1.12 MG/DL
EOSINOPHIL # BLD AUTO: 0.19 K/UL
EOSINOPHIL NFR BLD AUTO: 3.2 %
FOLATE SERPL-MCNC: 5.4 NG/ML
GLUCOSE SERPL-MCNC: 161 MG/DL
HBA1C MFR BLD HPLC: 5.7 %
HCT VFR BLD CALC: 39.4 %
HDLC SERPL-MCNC: 44 MG/DL
HGB BLD-MCNC: 12.5 G/DL
IMM GRANULOCYTES NFR BLD AUTO: 0.3 %
LDLC SERPL CALC-MCNC: 64 MG/DL
LYMPHOCYTES # BLD AUTO: 1.2 K/UL
LYMPHOCYTES NFR BLD AUTO: 20 %
MAN DIFF?: NORMAL
MCHC RBC-ENTMCNC: 29.5 PG
MCHC RBC-ENTMCNC: 31.7 GM/DL
MCV RBC AUTO: 92.9 FL
MONOCYTES # BLD AUTO: 0.37 K/UL
MONOCYTES NFR BLD AUTO: 6.2 %
NEUTROPHILS # BLD AUTO: 4.19 K/UL
NEUTROPHILS NFR BLD AUTO: 69.8 %
PLATELET # BLD AUTO: 257 K/UL
POTASSIUM SERPL-SCNC: 4.4 MMOL/L
PROT SERPL-MCNC: 7.1 G/DL
RBC # BLD: 4.24 M/UL
RBC # FLD: 13.3 %
SODIUM SERPL-SCNC: 142 MMOL/L
T4 FREE SERPL-MCNC: 1.2 NG/DL
TRIGL SERPL-MCNC: 182 MG/DL
TSH SERPL-ACNC: 2.04 UIU/ML
VIT B12 SERPL-MCNC: 260 PG/ML
WBC # FLD AUTO: 6 K/UL

## 2018-11-27 ENCOUNTER — RX RENEWAL (OUTPATIENT)
Age: 83
End: 2018-11-27

## 2018-12-15 ENCOUNTER — EMERGENCY (EMERGENCY)
Facility: HOSPITAL | Age: 83
LOS: 1 days | Discharge: HOME | End: 2018-12-15
Attending: EMERGENCY MEDICINE | Admitting: EMERGENCY MEDICINE
Payer: COMMERCIAL

## 2018-12-15 VITALS
DIASTOLIC BLOOD PRESSURE: 83 MMHG | RESPIRATION RATE: 17 BRPM | WEIGHT: 225.09 LBS | TEMPERATURE: 98 F | OXYGEN SATURATION: 98 % | HEART RATE: 73 BPM | SYSTOLIC BLOOD PRESSURE: 149 MMHG | HEIGHT: 65 IN

## 2018-12-15 PROCEDURE — 70450 CT HEAD/BRAIN W/O DYE: CPT

## 2018-12-15 PROCEDURE — 12002 RPR S/N/AX/GEN/TRNK2.6-7.5CM: CPT

## 2018-12-15 PROCEDURE — 70450 CT HEAD/BRAIN W/O DYE: CPT | Mod: 26

## 2018-12-15 PROCEDURE — 99284 EMERGENCY DEPT VISIT MOD MDM: CPT | Mod: 25

## 2018-12-15 NOTE — ED PROVIDER NOTE - NSFOLLOWUPINSTRUCTIONS_ED_ALL_ED_FT
Follow up for a wound check in 2 days with your primary care provider  keep would clean and dry. clean daily with soap and water  return in 10 days to remove staples  Any worsening of symptoms or new concerning symptoms return to the ED

## 2018-12-15 NOTE — ED ADULT NURSE NOTE - NSIMPLEMENTINTERV_GEN_ALL_ED
Implemented All Fall with Harm Risk Interventions:  [place] to call system. Call bell, personal items and telephone within reach. Instruct patient to call for assistance. Room bathroom lighting operational. Non-slip footwear when patient is off stretcher. Physically safe environment: no spills, clutter or unnecessary equipment. Stretcher in lowest position, wheels locked, appropriate side rails in place. Provide visual cue, wrist band, yellow gown, etc. Monitor gait and stability. Monitor for mental status changes and reorient to person, place, and time. Review medications for side effects contributing to fall risk. Reinforce activity limits and safety measures with patient and family. Provide visual clues: red socks.

## 2018-12-15 NOTE — ED ADULT TRIAGE NOTE - CHIEF COMPLAINT QUOTE
she fell and hit her head she is complaining of a headache. she has generalized weakness and that's why she fell. As per EMS

## 2018-12-15 NOTE — ED PROVIDER NOTE - ATTENDING CONTRIBUTION TO CARE
Omer with HARJINDER Serrato. Pt with h/o dizziness. Says that she turned quickly and lost her balance. No chest pain. No headache. Hit her head. No LOC. No vomiting. No other complaints. Pt states she gets dizzy every day. She was told that it is also compounded by her medication compilation. Vitals stable. Plan CT head and laceration repair. I performed a face to face bedside interview with patient regarding history of present illness, review of symptoms and past medical history. I completed an independent physical exam.  I have discussed the patient's plan of care with Physician Assistant (PA). I agree with note as stated above, having amended the EMR as needed to reflect my findings.   This includes History of Present Illness, HIV, Past Medical/Surgical/Family/Social History, Allergies and Home Medications, Review of Systems, Physical Exam, and any Progress Notes during the time I functioned as the attending physician for this patient.

## 2018-12-15 NOTE — ED ADULT NURSE NOTE - OBJECTIVE STATEMENT
81 y/o female came in via ems s/p fall. pt states she has hx of dizzy spells and got up too fast when she fell back and hit her head. no loc. pt complaining of a headache. pt has small cut to the back of her head. pt takes many pain medications that may make her dizzy. neuro intact. no chest pain no sob. pt denies any other injuries. PERRLA.  at bedside.

## 2018-12-15 NOTE — ED PROVIDER NOTE - PHYSICAL EXAMINATION
General:     NAD, obese, well-nourished, well-appearing  Eyes: PERRL  Head:     2 cm laceration to posterior scalp  Neck:     trachea midline  Lungs:     CTA b/l  CVS:     RRR  Abd:     +BS, s/nt/nd  Ext:   no deformities. old scar right knee   Neuro: AAOx3, no sensory/motor deficits General:     NAD, obese, well-nourished, well-appearing  Eyes: PERRL  Head:     3 cm laceration to posterior scalp  Neck:     trachea midline  Lungs:     CTA b/l  CVS:     RRR  Abd:     +BS, s/nt/nd  Ext:   no deformities. old scar right knee   Neuro: AAOx3, no sensory/motor deficits

## 2018-12-15 NOTE — ED PROVIDER NOTE - OBJECTIVE STATEMENT
pt 81 yo f hx anemia, balance issues, and gets lightheaded when bending over/getting up c/o standing in her kitchen and she turned around quickly and lost her balance and fell and hit her head. denies LOC, n/v, current dizziness  has had this happen many times

## 2018-12-17 ENCOUNTER — APPOINTMENT (OUTPATIENT)
Dept: FAMILY MEDICINE | Facility: CLINIC | Age: 83
End: 2018-12-17
Payer: MEDICARE

## 2018-12-17 VITALS — RESPIRATION RATE: 20 BRPM | HEART RATE: 68 BPM | DIASTOLIC BLOOD PRESSURE: 70 MMHG | SYSTOLIC BLOOD PRESSURE: 122 MMHG

## 2018-12-17 DIAGNOSIS — S00.93XA CONTUSION OF UNSPECIFIED PART OF HEAD, INITIAL ENCOUNTER: ICD-10-CM

## 2018-12-17 PROBLEM — D64.9 ANEMIA, UNSPECIFIED: Chronic | Status: ACTIVE | Noted: 2018-12-15

## 2018-12-17 PROCEDURE — 99213 OFFICE O/P EST LOW 20 MIN: CPT

## 2018-12-17 NOTE — PHYSICAL EXAM
[No Acute Distress] : no acute distress [Normal Sclera/Conjunctiva] : normal sclera/conjunctiva [PERRL] : pupils equal round and reactive to light [EOMI] : extraocular movements intact [Fundoscopic Exam Performed] : fundoscopic ~T exam ~C was performed [Normal] : the fundoscopic exam was normal in both eyes [Normal Outer Ear/Nose] : the outer ears and nose were normal in appearance [Normal Oropharynx] : the oropharynx was normal [Normal TMs] : both tympanic membranes were normal [Normal Nasal Mucosa] : the nasal mucosa was normal [Supple] : supple [No Respiratory Distress] : no respiratory distress  [Clear to Auscultation] : lungs were clear to auscultation bilaterally [No Accessory Muscle Use] : no accessory muscle use [Normal Rate] : normal rate  [Regular Rhythm] : with a regular rhythm [Normal S1, S2] : normal S1 and S2 [No Murmur] : no murmur heard [Soft] : abdomen soft [Non Tender] : non-tender [Grossly Normal Strength/Tone] : grossly normal strength/tone [No Rash] : no rash [No Skin Lesions] : no skin lesions [Normal Gait] : normal gait [Coordination Grossly Intact] : coordination grossly intact [No Focal Deficits] : no focal deficits [Alert and Oriented x3] : oriented to person, place, and time [de-identified] : stapled laceration occiput; clean and dry

## 2018-12-17 NOTE — COUNSELING
[None] : None [Good understanding] : Patient has a good understanding of lifestyle changes and the steps needed to achieve self management goals [de-identified] : safety in the home

## 2018-12-17 NOTE — HISTORY OF PRESENT ILLNESS
[FreeTextEntry8] : Presents on acute basis as F/U to ER visit - states "turned around quickly" while in kitchen and lost balance, falling and striking back of head.  States no LOC.  Had scalp laceration that was stapled in ER.  Reviewed all ER documentation and imaging.  No complaints at present.

## 2018-12-26 ENCOUNTER — APPOINTMENT (OUTPATIENT)
Dept: FAMILY MEDICINE | Facility: CLINIC | Age: 83
End: 2018-12-26
Payer: MEDICARE

## 2018-12-26 VITALS — DIASTOLIC BLOOD PRESSURE: 60 MMHG | SYSTOLIC BLOOD PRESSURE: 122 MMHG | RESPIRATION RATE: 20 BRPM | HEART RATE: 68 BPM

## 2018-12-26 PROCEDURE — 99213 OFFICE O/P EST LOW 20 MIN: CPT

## 2018-12-26 NOTE — PHYSICAL EXAM
[No Acute Distress] : no acute distress [Supple] : supple [No Respiratory Distress] : no respiratory distress  [Clear to Auscultation] : lungs were clear to auscultation bilaterally [No Accessory Muscle Use] : no accessory muscle use [Normal Rate] : normal rate  [Regular Rhythm] : with a regular rhythm [Normal S1, S2] : normal S1 and S2 [No Murmur] : no murmur heard [Skin Injury 1] : Skin injury: [Tissue Injury Abrasion] : laceration was noted [Sm] : a small [No Focal Deficits] : no focal deficits [Alert and Oriented x3] : oriented to person, place, and time [de-identified] : well-healed scalp laceration with four staples intact

## 2019-02-05 ENCOUNTER — APPOINTMENT (OUTPATIENT)
Dept: FAMILY MEDICINE | Facility: CLINIC | Age: 84
End: 2019-02-05
Payer: MEDICARE

## 2019-02-05 VITALS
DIASTOLIC BLOOD PRESSURE: 70 MMHG | BODY MASS INDEX: 34.82 KG/M2 | SYSTOLIC BLOOD PRESSURE: 115 MMHG | WEIGHT: 209 LBS | RESPIRATION RATE: 20 BRPM | HEIGHT: 65 IN | HEART RATE: 76 BPM

## 2019-02-05 PROCEDURE — 99214 OFFICE O/P EST MOD 30 MIN: CPT | Mod: 25

## 2019-02-05 PROCEDURE — 36415 COLL VENOUS BLD VENIPUNCTURE: CPT

## 2019-02-05 NOTE — HISTORY OF PRESENT ILLNESS
[de-identified] : Presents for BP check, labs, and general follow-up.  No new complaints at this encounter.  Discussed safety - pt denies falls or other injuries.  Discussed diet - note small but desirable wt loss.

## 2019-02-05 NOTE — PHYSICAL EXAM
[No Acute Distress] : no acute distress [No JVD] : no jugular venous distention [Supple] : supple [No Lymphadenopathy] : no lymphadenopathy [Thyroid Normal, No Nodules] : the thyroid was normal and there were no nodules present [No Respiratory Distress] : no respiratory distress  [Clear to Auscultation] : lungs were clear to auscultation bilaterally [No Accessory Muscle Use] : no accessory muscle use [Normal Rate] : normal rate  [Regular Rhythm] : with a regular rhythm [Normal S1, S2] : normal S1 and S2 [No Murmur] : no murmur heard [No Edema] : there was no peripheral edema [Soft] : abdomen soft [Non Tender] : non-tender [Motor Strength Lower Extremities Bilaterally] : there was weakness in both lower extremities [Limited Balance] : the patient's balance was impaired [Alert and Oriented x3] : oriented to person, place, and time

## 2019-02-05 NOTE — ASSESSMENT
[FreeTextEntry1] : Hemodynamically stable with acceptable BP\par No clinical evidence of thyroid pathology\par Neuromuscular findings consistent with history\par Lab profiles sent

## 2019-02-07 LAB
ALBUMIN SERPL ELPH-MCNC: 4.5 G/DL
ALP BLD-CCNC: 123 U/L
ALT SERPL-CCNC: 6 U/L
ANION GAP SERPL CALC-SCNC: 14 MMOL/L
AST SERPL-CCNC: 13 U/L
BILIRUB SERPL-MCNC: 0.6 MG/DL
BUN SERPL-MCNC: 27 MG/DL
CALCIUM SERPL-MCNC: 10.2 MG/DL
CHLORIDE SERPL-SCNC: 102 MMOL/L
CHOLEST SERPL-MCNC: 140 MG/DL
CHOLEST/HDLC SERPL: 2.7 RATIO
CO2 SERPL-SCNC: 27 MMOL/L
CREAT SERPL-MCNC: 1.43 MG/DL
FOLATE SERPL-MCNC: 4 NG/ML
GLUCOSE SERPL-MCNC: 118 MG/DL
HBA1C MFR BLD HPLC: 5.7 %
HDLC SERPL-MCNC: 51 MG/DL
LDLC SERPL CALC-MCNC: 55 MG/DL
POTASSIUM SERPL-SCNC: 4.5 MMOL/L
PROT SERPL-MCNC: 7.5 G/DL
SODIUM SERPL-SCNC: 143 MMOL/L
T4 FREE SERPL-MCNC: 1.1 NG/DL
TRIGL SERPL-MCNC: 169 MG/DL
TSH SERPL-ACNC: 2.45 UIU/ML
VIT B12 SERPL-MCNC: 285 PG/ML

## 2019-02-08 ENCOUNTER — RESULT REVIEW (OUTPATIENT)
Age: 84
End: 2019-02-08

## 2019-02-08 DIAGNOSIS — E53.8 DEFICIENCY OF OTHER SPECIFIED B GROUP VITAMINS: ICD-10-CM

## 2019-03-27 ENCOUNTER — APPOINTMENT (OUTPATIENT)
Dept: FAMILY MEDICINE | Facility: CLINIC | Age: 84
End: 2019-03-27
Payer: MEDICARE

## 2019-03-27 VITALS
OXYGEN SATURATION: 97 % | TEMPERATURE: 97.6 F | WEIGHT: 207 LBS | BODY MASS INDEX: 35.34 KG/M2 | SYSTOLIC BLOOD PRESSURE: 108 MMHG | HEIGHT: 64 IN | DIASTOLIC BLOOD PRESSURE: 60 MMHG | HEART RATE: 78 BPM

## 2019-03-27 DIAGNOSIS — Z87.09 PERSONAL HISTORY OF OTHER DISEASES OF THE RESPIRATORY SYSTEM: ICD-10-CM

## 2019-03-27 PROCEDURE — 99212 OFFICE O/P EST SF 10 MIN: CPT

## 2019-03-27 NOTE — PHYSICAL EXAM

## 2019-03-27 NOTE — REVIEW OF SYSTEMS
[Fever] : fever [Chills] : chills [Nasal Discharge] : nasal discharge [Sore Throat] : sore throat [Cough] : cough [Muscle Pain] : muscle pain [Negative] : Heme/Lymph

## 2019-03-27 NOTE — HEALTH RISK ASSESSMENT
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

## 2019-03-27 NOTE — HISTORY OF PRESENT ILLNESS
[FreeTextEntry8] : Noted fatigue, runny nose, non productive cough, with associated muscle aches and nasal congestion since last Tuesday. \par Patient denies fever or chills. \par \par Symptoms worse during the day. Symptoms are worsening overall.\par \par Denies LOVE, chest pain or wheezing. \par \par Has not taken over the counter medication.\par \par Sick contact is .

## 2019-03-27 NOTE — PLAN
[FreeTextEntry1] : Due to length and severity of symptoms i will cover for  CAP\par Patient encouraged to\par Increase fluid intake\par Rest\par Tylenol for fever and aches\par Flonase\par Return to office if symptoms persist or worsen

## 2019-04-08 ENCOUNTER — MEDICATION RENEWAL (OUTPATIENT)
Age: 84
End: 2019-04-08

## 2019-04-09 ENCOUNTER — TRANSCRIPTION ENCOUNTER (OUTPATIENT)
Age: 84
End: 2019-04-09

## 2019-05-02 ENCOUNTER — RX RENEWAL (OUTPATIENT)
Age: 84
End: 2019-05-02

## 2019-05-07 ENCOUNTER — APPOINTMENT (OUTPATIENT)
Dept: FAMILY MEDICINE | Facility: CLINIC | Age: 84
End: 2019-05-07

## 2019-05-09 ENCOUNTER — RX RENEWAL (OUTPATIENT)
Age: 84
End: 2019-05-09

## 2019-05-16 ENCOUNTER — RX RENEWAL (OUTPATIENT)
Age: 84
End: 2019-05-16

## 2019-05-24 ENCOUNTER — APPOINTMENT (OUTPATIENT)
Dept: FAMILY MEDICINE | Facility: CLINIC | Age: 84
End: 2019-05-24
Payer: MEDICARE

## 2019-05-24 VITALS
WEIGHT: 206 LBS | BODY MASS INDEX: 35.17 KG/M2 | HEART RATE: 76 BPM | RESPIRATION RATE: 20 BRPM | SYSTOLIC BLOOD PRESSURE: 112 MMHG | DIASTOLIC BLOOD PRESSURE: 70 MMHG | HEIGHT: 64 IN

## 2019-05-24 PROCEDURE — 36415 COLL VENOUS BLD VENIPUNCTURE: CPT

## 2019-05-24 PROCEDURE — 99214 OFFICE O/P EST MOD 30 MIN: CPT | Mod: 25

## 2019-05-24 NOTE — PHYSICAL EXAM
[No Acute Distress] : no acute distress [No JVD] : no jugular venous distention [Supple] : supple [No Lymphadenopathy] : no lymphadenopathy [No Respiratory Distress] : no respiratory distress  [Thyroid Normal, No Nodules] : the thyroid was normal and there were no nodules present [Clear to Auscultation] : lungs were clear to auscultation bilaterally [No Accessory Muscle Use] : no accessory muscle use [Normal Rate] : normal rate  [Regular Rhythm] : with a regular rhythm [No Murmur] : no murmur heard [Normal S1, S2] : normal S1 and S2 [Soft] : abdomen soft [No Edema] : there was no peripheral edema [Alert and Oriented x3] : oriented to person, place, and time [No Focal Deficits] : no focal deficits [Non Tender] : non-tender

## 2019-05-24 NOTE — COUNSELING
[Activity counseling provided] : activity [Healthy eating counseling provided] : healthy eating [None] : None [Fall prevention counseling provided] : fall prevention  [Good understanding] : Patient has a good understanding of lifestyle changes and the steps needed to achieve self management goals

## 2019-05-24 NOTE — HISTORY OF PRESENT ILLNESS
[de-identified] : Presents for BP check, labs, and general follow-up.  States feeling generally well; no new complaints.

## 2019-05-24 NOTE — ASSESSMENT
[FreeTextEntry1] : Hemodynamically stable with acceptable BP\par No clinical evidence of thyroid pathology\par Lab profiles sent

## 2019-05-24 NOTE — HEALTH RISK ASSESSMENT
[0] : 1) Little interest or pleasure doing things: Not at all (0) [Fully functional (bathing, dressing, toileting, transferring, walking, feeding)] : Fully functional (bathing, dressing, toileting, transferring, walking, feeding) [Fully functional (using the telephone, shopping, preparing meals, housekeeping, doing laundry, using] : Fully functional and needs no help or supervision to perform IADLs (using the telephone, shopping, preparing meals, housekeeping, doing laundry, using transportation, managing medications and managing finances) [Reviewed no changes] : Reviewed no changes [] : No [AdvancecareDate] : 05/19

## 2019-05-25 LAB
ALBUMIN SERPL ELPH-MCNC: 4.3 G/DL
ALP BLD-CCNC: 114 U/L
ALT SERPL-CCNC: 9 U/L
ANION GAP SERPL CALC-SCNC: 14 MMOL/L
AST SERPL-CCNC: 10 U/L
BASOPHILS # BLD AUTO: 0.04 K/UL
BASOPHILS # BLD AUTO: 0.06 K/UL
BASOPHILS NFR BLD AUTO: 0.5 %
BASOPHILS NFR BLD AUTO: 0.8 %
BILIRUB SERPL-MCNC: 0.6 MG/DL
BUN SERPL-MCNC: 25 MG/DL
CALCIUM SERPL-MCNC: 9.5 MG/DL
CHLORIDE SERPL-SCNC: 104 MMOL/L
CHOLEST SERPL-MCNC: 131 MG/DL
CHOLEST/HDLC SERPL: 2.7 RATIO
CO2 SERPL-SCNC: 26 MMOL/L
CREAT SERPL-MCNC: 1.37 MG/DL
EOSINOPHIL # BLD AUTO: 0.16 K/UL
EOSINOPHIL # BLD AUTO: 0.25 K/UL
EOSINOPHIL NFR BLD AUTO: 2.2 %
EOSINOPHIL NFR BLD AUTO: 3.1 %
ESTIMATED AVERAGE GLUCOSE: 111 MG/DL
FOLATE SERPL-MCNC: >20 NG/ML
GLUCOSE SERPL-MCNC: 95 MG/DL
HBA1C MFR BLD HPLC: 5.5 %
HCT VFR BLD CALC: 40.8 %
HCT VFR BLD CALC: 42.1 %
HDLC SERPL-MCNC: 48 MG/DL
HGB BLD-MCNC: 13.2 G/DL
HGB BLD-MCNC: 13.3 G/DL
IMM GRANULOCYTES NFR BLD AUTO: 0.1 %
IMM GRANULOCYTES NFR BLD AUTO: 0.4 %
LDLC SERPL CALC-MCNC: 55 MG/DL
LYMPHOCYTES # BLD AUTO: 1.47 K/UL
LYMPHOCYTES # BLD AUTO: 1.67 K/UL
LYMPHOCYTES NFR BLD AUTO: 18.2 %
LYMPHOCYTES NFR BLD AUTO: 23 %
MAN DIFF?: NORMAL
MAN DIFF?: NORMAL
MCHC RBC-ENTMCNC: 29.8 PG
MCHC RBC-ENTMCNC: 30.6 PG
MCHC RBC-ENTMCNC: 31.6 GM/DL
MCHC RBC-ENTMCNC: 32.4 GM/DL
MCV RBC AUTO: 94.2 FL
MCV RBC AUTO: 94.4 FL
MONOCYTES # BLD AUTO: 0.43 K/UL
MONOCYTES # BLD AUTO: 0.49 K/UL
MONOCYTES NFR BLD AUTO: 5.3 %
MONOCYTES NFR BLD AUTO: 6.7 %
NEUTROPHILS # BLD AUTO: 4.85 K/UL
NEUTROPHILS # BLD AUTO: 5.89 K/UL
NEUTROPHILS NFR BLD AUTO: 66.9 %
NEUTROPHILS NFR BLD AUTO: 72.8 %
PLATELET # BLD AUTO: 274 K/UL
PLATELET # BLD AUTO: 285 K/UL
POTASSIUM SERPL-SCNC: 4.8 MMOL/L
PROT SERPL-MCNC: 7 G/DL
RBC # BLD: 4.32 M/UL
RBC # BLD: 4.47 M/UL
RBC # FLD: 12.5 %
RBC # FLD: 13.1 %
SODIUM SERPL-SCNC: 144 MMOL/L
T4 FREE SERPL-MCNC: 1.1 NG/DL
TRIGL SERPL-MCNC: 138 MG/DL
TSH SERPL-ACNC: 2.13 UIU/ML
VIT B12 SERPL-MCNC: 260 PG/ML
WBC # FLD AUTO: 7.26 K/UL
WBC # FLD AUTO: 8.09 K/UL

## 2019-06-18 ENCOUNTER — FORM ENCOUNTER (OUTPATIENT)
Age: 84
End: 2019-06-18

## 2019-06-19 ENCOUNTER — OUTPATIENT (OUTPATIENT)
Dept: OUTPATIENT SERVICES | Facility: HOSPITAL | Age: 84
LOS: 1 days | End: 2019-06-19
Payer: COMMERCIAL

## 2019-06-19 ENCOUNTER — APPOINTMENT (OUTPATIENT)
Dept: FAMILY MEDICINE | Facility: CLINIC | Age: 84
End: 2019-06-19
Payer: MEDICARE

## 2019-06-19 ENCOUNTER — APPOINTMENT (OUTPATIENT)
Dept: RADIOLOGY | Facility: HOSPITAL | Age: 84
End: 2019-06-19
Payer: MEDICARE

## 2019-06-19 ENCOUNTER — NON-APPOINTMENT (OUTPATIENT)
Age: 84
End: 2019-06-19

## 2019-06-19 VITALS
OXYGEN SATURATION: 97 % | HEIGHT: 64 IN | BODY MASS INDEX: 35.51 KG/M2 | DIASTOLIC BLOOD PRESSURE: 60 MMHG | TEMPERATURE: 97.8 F | SYSTOLIC BLOOD PRESSURE: 140 MMHG | HEART RATE: 78 BPM | WEIGHT: 208 LBS

## 2019-06-19 DIAGNOSIS — M25.512 PAIN IN LEFT SHOULDER: ICD-10-CM

## 2019-06-19 DIAGNOSIS — M79.2 NEURALGIA AND NEURITIS, UNSPECIFIED: ICD-10-CM

## 2019-06-19 DIAGNOSIS — M25.519 PAIN IN UNSPECIFIED SHOULDER: ICD-10-CM

## 2019-06-19 DIAGNOSIS — M54.2 CERVICALGIA: ICD-10-CM

## 2019-06-19 PROCEDURE — 73030 X-RAY EXAM OF SHOULDER: CPT | Mod: 26,LT

## 2019-06-19 PROCEDURE — 72050 X-RAY EXAM NECK SPINE 4/5VWS: CPT | Mod: 26

## 2019-06-19 PROCEDURE — 73060 X-RAY EXAM OF HUMERUS: CPT | Mod: 26,LT

## 2019-06-19 PROCEDURE — 73060 X-RAY EXAM OF HUMERUS: CPT

## 2019-06-19 PROCEDURE — 99214 OFFICE O/P EST MOD 30 MIN: CPT | Mod: Q5

## 2019-06-19 PROCEDURE — 72050 X-RAY EXAM NECK SPINE 4/5VWS: CPT

## 2019-06-19 PROCEDURE — 73030 X-RAY EXAM OF SHOULDER: CPT

## 2019-06-19 RX ORDER — DOXYCYCLINE HYCLATE 100 MG/1
100 TABLET ORAL
Qty: 14 | Refills: 0 | Status: DISCONTINUED | COMMUNITY
Start: 2019-03-27 | End: 2019-06-19

## 2019-06-19 RX ORDER — TRAMADOL HYDROCHLORIDE 50 MG/1
50 TABLET, COATED ORAL DAILY
Qty: 15 | Refills: 0 | Status: ACTIVE | COMMUNITY
Start: 2019-06-19 | End: 1900-01-01

## 2019-06-21 NOTE — HISTORY OF PRESENT ILLNESS
[Musculoskeletal Symptoms Arms] : arms [___ Weeks ago] : [unfilled] weeks ago [Episodic] : episodic [Severe] : severe [Stable] : stable [Activity] : with activity [FreeTextEntry3] : 8/10 [FreeTextEntry7] : left more than right, dull pain  [FreeTextEntry2] : neck pain and difficulty raising arm  above shoulder level [FreeTextEntry5] : none [FreeTextEntry8] : No injuries.

## 2019-06-21 NOTE — HEALTH RISK ASSESSMENT
[No falls in past year] : Patient reported no falls in the past year [0] : 2) Feeling down, depressed, or hopeless: Not at all (0) [] : No [[id number]] : 0

## 2019-06-21 NOTE — PHYSICAL EXAM

## 2019-07-12 ENCOUNTER — RX RENEWAL (OUTPATIENT)
Age: 84
End: 2019-07-12

## 2019-07-24 ENCOUNTER — RX RENEWAL (OUTPATIENT)
Age: 84
End: 2019-07-24

## 2019-07-24 RX ORDER — CYCLOBENZAPRINE HYDROCHLORIDE 5 MG/1
5 TABLET, FILM COATED ORAL
Qty: 15 | Refills: 0 | Status: ACTIVE | COMMUNITY
Start: 2019-06-19 | End: 1900-01-01

## 2019-08-23 ENCOUNTER — APPOINTMENT (OUTPATIENT)
Dept: FAMILY MEDICINE | Facility: CLINIC | Age: 84
End: 2019-08-23
Payer: MEDICARE

## 2019-08-23 VITALS
HEIGHT: 64 IN | WEIGHT: 197 LBS | RESPIRATION RATE: 20 BRPM | BODY MASS INDEX: 33.63 KG/M2 | HEART RATE: 76 BPM | DIASTOLIC BLOOD PRESSURE: 75 MMHG | SYSTOLIC BLOOD PRESSURE: 124 MMHG

## 2019-08-23 PROCEDURE — 99214 OFFICE O/P EST MOD 30 MIN: CPT | Mod: 25

## 2019-08-23 PROCEDURE — 36415 COLL VENOUS BLD VENIPUNCTURE: CPT

## 2019-08-23 NOTE — PHYSICAL EXAM
[No Acute Distress] : no acute distress [Normal] : normal rate, regular rhythm, normal S1 and S2 and no murmur heard [No Edema] : there was no peripheral edema [Non Tender] : non-tender [No Focal Deficits] : no focal deficits [Soft] : abdomen soft [Alert and Oriented x3] : oriented to person, place, and time

## 2019-08-23 NOTE — HISTORY OF PRESENT ILLNESS
[de-identified] : Presents for BP check, labs, and general follow-up.  States has been trying to watch diet - note very desirable wt loss.  No new complaints.

## 2019-08-26 LAB
ALBUMIN SERPL ELPH-MCNC: 4.4 G/DL
ALP BLD-CCNC: 111 U/L
ALT SERPL-CCNC: 6 U/L
ANION GAP SERPL CALC-SCNC: 16 MMOL/L
AST SERPL-CCNC: 13 U/L
BASOPHILS # BLD AUTO: 0.04 K/UL
BASOPHILS NFR BLD AUTO: 0.6 %
BILIRUB SERPL-MCNC: 0.8 MG/DL
BUN SERPL-MCNC: 17 MG/DL
CALCIUM SERPL-MCNC: 9.8 MG/DL
CHLORIDE SERPL-SCNC: 105 MMOL/L
CHOLEST SERPL-MCNC: 135 MG/DL
CHOLEST/HDLC SERPL: 3.2 RATIO
CO2 SERPL-SCNC: 26 MMOL/L
CREAT SERPL-MCNC: 1.33 MG/DL
EOSINOPHIL # BLD AUTO: 0.14 K/UL
EOSINOPHIL NFR BLD AUTO: 2 %
ESTIMATED AVERAGE GLUCOSE: 111 MG/DL
FOLATE SERPL-MCNC: >20 NG/ML
GLUCOSE SERPL-MCNC: 124 MG/DL
HBA1C MFR BLD HPLC: 5.5 %
HCT VFR BLD CALC: 42.8 %
HDLC SERPL-MCNC: 42 MG/DL
HGB BLD-MCNC: 13.5 G/DL
IMM GRANULOCYTES NFR BLD AUTO: 0.3 %
LDLC SERPL CALC-MCNC: 60 MG/DL
LYMPHOCYTES # BLD AUTO: 1.36 K/UL
LYMPHOCYTES NFR BLD AUTO: 19.6 %
MAN DIFF?: NORMAL
MCHC RBC-ENTMCNC: 29.8 PG
MCHC RBC-ENTMCNC: 31.5 GM/DL
MCV RBC AUTO: 94.5 FL
MONOCYTES # BLD AUTO: 0.46 K/UL
MONOCYTES NFR BLD AUTO: 6.6 %
NEUTROPHILS # BLD AUTO: 4.92 K/UL
NEUTROPHILS NFR BLD AUTO: 70.9 %
PLATELET # BLD AUTO: 287 K/UL
POTASSIUM SERPL-SCNC: 4.7 MMOL/L
PROT SERPL-MCNC: 7.4 G/DL
RBC # BLD: 4.53 M/UL
RBC # FLD: 12.3 %
SODIUM SERPL-SCNC: 147 MMOL/L
T4 FREE SERPL-MCNC: 1.1 NG/DL
TRIGL SERPL-MCNC: 163 MG/DL
TSH SERPL-ACNC: 1.78 UIU/ML
VIT B12 SERPL-MCNC: 275 PG/ML
WBC # FLD AUTO: 6.94 K/UL

## 2019-09-16 ENCOUNTER — RX RENEWAL (OUTPATIENT)
Age: 84
End: 2019-09-16

## 2019-10-28 ENCOUNTER — RX RENEWAL (OUTPATIENT)
Age: 84
End: 2019-10-28

## 2019-11-03 ENCOUNTER — RX RENEWAL (OUTPATIENT)
Age: 84
End: 2019-11-03

## 2019-11-12 ENCOUNTER — OTHER (OUTPATIENT)
Age: 84
End: 2019-11-12

## 2019-11-25 ENCOUNTER — RX RENEWAL (OUTPATIENT)
Age: 84
End: 2019-11-25

## 2019-11-28 ENCOUNTER — EMERGENCY (EMERGENCY)
Facility: HOSPITAL | Age: 84
LOS: 1 days | Discharge: HOME | End: 2019-11-28
Attending: EMERGENCY MEDICINE | Admitting: EMERGENCY MEDICINE
Payer: COMMERCIAL

## 2019-11-28 VITALS
OXYGEN SATURATION: 100 % | HEIGHT: 66 IN | RESPIRATION RATE: 16 BRPM | WEIGHT: 201.5 LBS | TEMPERATURE: 98 F | SYSTOLIC BLOOD PRESSURE: 148 MMHG | DIASTOLIC BLOOD PRESSURE: 81 MMHG | HEART RATE: 79 BPM

## 2019-11-28 PROCEDURE — 73562 X-RAY EXAM OF KNEE 3: CPT | Mod: 26,LT

## 2019-11-28 PROCEDURE — 99284 EMERGENCY DEPT VISIT MOD MDM: CPT | Mod: 25

## 2019-11-28 PROCEDURE — 72125 CT NECK SPINE W/O DYE: CPT | Mod: 26

## 2019-11-28 PROCEDURE — 12002 RPR S/N/AX/GEN/TRNK2.6-7.5CM: CPT

## 2019-11-28 PROCEDURE — 70450 CT HEAD/BRAIN W/O DYE: CPT | Mod: 26

## 2019-11-28 RX ORDER — OXYCODONE AND ACETAMINOPHEN 5; 325 MG/1; MG/1
1 TABLET ORAL
Qty: 16 | Refills: 0
Start: 2019-11-28 | End: 2019-12-01

## 2019-11-28 RX ORDER — ACETAMINOPHEN 500 MG
650 TABLET ORAL ONCE
Refills: 0 | Status: COMPLETED | OUTPATIENT
Start: 2019-11-28 | End: 2019-11-28

## 2019-11-28 RX ORDER — OXYCODONE HYDROCHLORIDE 5 MG/1
0 TABLET ORAL
Qty: 0 | Refills: 0 | DISCHARGE

## 2019-11-28 RX ORDER — ZOLPIDEM TARTRATE 10 MG/1
1 TABLET ORAL
Qty: 0 | Refills: 0 | DISCHARGE

## 2019-11-28 RX ORDER — MECLIZINE HCL 12.5 MG
0 TABLET ORAL
Qty: 0 | Refills: 0 | DISCHARGE

## 2019-11-28 RX ORDER — MULTIVIT-MIN/FERROUS GLUCONATE 9 MG/15 ML
1 LIQUID (ML) ORAL
Qty: 0 | Refills: 0 | DISCHARGE

## 2019-11-28 RX ORDER — OXYCODONE HYDROCHLORIDE 5 MG/1
5 TABLET ORAL ONCE
Refills: 0 | Status: DISCONTINUED | OUTPATIENT
Start: 2019-11-28 | End: 2019-11-28

## 2019-11-28 RX ORDER — IRBESARTAN 75 MG/1
300 TABLET ORAL
Qty: 0 | Refills: 0 | DISCHARGE

## 2019-11-28 RX ORDER — OMEGA-3 ACID ETHYL ESTERS 1 G
2 CAPSULE ORAL
Qty: 0 | Refills: 0 | DISCHARGE

## 2019-11-28 RX ORDER — AMLODIPINE BESYLATE 2.5 MG/1
1 TABLET ORAL
Qty: 0 | Refills: 0 | DISCHARGE

## 2019-11-28 RX ORDER — CITALOPRAM 10 MG/1
1 TABLET, FILM COATED ORAL
Qty: 0 | Refills: 0 | DISCHARGE

## 2019-11-28 RX ADMIN — Medication 650 MILLIGRAM(S): at 22:22

## 2019-11-28 NOTE — ED ADULT NURSE NOTE - NS PRO AD BILL OF RIGHTS
Yes Purse String (Intermediate) Text: Given the location of the defect and the characteristics of the surrounding skin a pursestring intermediate closure was deemed most appropriate.  Undermining was performed circumfirentially around the surgical defect.  A purstring suture was then placed and tightened.

## 2019-11-28 NOTE — ED ADULT TRIAGE NOTE - NS ED NURSE DIRECT TO ROOM YN
Behavioral Health discharge instructions and resources have been added to the After Visit Summary.     No

## 2019-11-28 NOTE — ED PROVIDER NOTE - OBJECTIVE STATEMENT
pt s/p mechanical trip and fall just prior to ED arrival, witnessed by son, pt hit her head and has laceration to the L frontal scalp. no LOC, no prodrome of dizziness or cp.

## 2019-11-28 NOTE — ED PROVIDER NOTE - PATIENT PORTAL LINK FT
You can access the FollowMyHealth Patient Portal offered by [facility] by registering at the following website: [email or web address]. By joining [facility]’s FollowMyHealth portal, you will also be able to view your health information using other applications (apps) compatible with our system.

## 2019-11-28 NOTE — ED ADULT NURSE NOTE - OBJECTIVE STATEMENT
Pt c/o head pain, forehead lac, left knee pain, and bilateral shoulder blade pain s/p trip and fall at home about 30 min PTA witnessed by family members. Pt was walking into house and tripped on the raised threshold. Pt normally ambulates with a walker. No LOC reported. Pt does take ASA 81 mg PO daily. Pt denies neck pain.

## 2019-11-28 NOTE — ED PROVIDER NOTE - NSFOLLOWUPINSTRUCTIONS_ED_ALL_ED_FT
-- Return to the ER in 5-7 days for staple removal.    -- Follow up with your primary care physician in 48 hours.    -- Return to the ER for worsening or persistent symptoms, and/or ANY NEW OR CONCERNING SYMPTOMS.    -- If you have difficulty following up, please call: [phone] to obtain a [facility] doctor or specialist who takes your insurance in your area.

## 2019-11-28 NOTE — ED ADULT NURSE REASSESSMENT NOTE - NS ED NURSE REASSESS COMMENT FT1
assumed nursing care from MYLES Chavez. pt is being DC'd. waiting for xray report. No acute distress noted. will continue to monitor.

## 2019-11-28 NOTE — ED PROVIDER NOTE - PHYSICAL EXAMINATION
Gen:  alert, awake, no acute distress  HEENT:  L frontal scalp laceration approx 3cm, airway clear, pupils equal and round  CV:  rrr, nl S1, S2, no m/r/g  Pulm:  lungs CTA b/l  Abd: s/nt/nd, +BS  Ext:  moving all extremities  Neuro:  grossly intact, no focal deficits  Skin:  clear, dry, intact  Psych: AOx3, normal affect, no apparent risk to self or others

## 2019-11-29 VITALS
HEART RATE: 80 BPM | SYSTOLIC BLOOD PRESSURE: 140 MMHG | DIASTOLIC BLOOD PRESSURE: 78 MMHG | TEMPERATURE: 98 F | RESPIRATION RATE: 17 BRPM | OXYGEN SATURATION: 100 %

## 2019-11-29 PROCEDURE — 99284 EMERGENCY DEPT VISIT MOD MDM: CPT | Mod: 25

## 2019-11-29 PROCEDURE — 72125 CT NECK SPINE W/O DYE: CPT

## 2019-11-29 PROCEDURE — 12002 RPR S/N/AX/GEN/TRNK2.6-7.5CM: CPT

## 2019-11-29 PROCEDURE — 73562 X-RAY EXAM OF KNEE 3: CPT

## 2019-11-29 PROCEDURE — 70450 CT HEAD/BRAIN W/O DYE: CPT

## 2019-11-29 RX ADMIN — OXYCODONE HYDROCHLORIDE 5 MILLIGRAM(S): 5 TABLET ORAL at 00:08

## 2019-12-05 ENCOUNTER — APPOINTMENT (OUTPATIENT)
Dept: FAMILY MEDICINE | Facility: CLINIC | Age: 84
End: 2019-12-05
Payer: MEDICARE

## 2019-12-05 VITALS — HEART RATE: 76 BPM | RESPIRATION RATE: 20 BRPM | SYSTOLIC BLOOD PRESSURE: 122 MMHG | DIASTOLIC BLOOD PRESSURE: 70 MMHG

## 2019-12-05 DIAGNOSIS — S01.01XA LACERATION W/OUT FOREIGN BODY OF SCALP, INITIAL ENCOUNTER: ICD-10-CM

## 2019-12-05 PROCEDURE — 99213 OFFICE O/P EST LOW 20 MIN: CPT

## 2019-12-05 NOTE — PHYSICAL EXAM
[No Acute Distress] : no acute distress [Supple] : supple [Normal] : normal rate, regular rhythm, normal S1 and S2 and no murmur heard [Non Tender] : non-tender [Soft] : abdomen soft [Normal Affect] : the affect was normal [No Focal Deficits] : no focal deficits [de-identified] : stapled laceration noted L upper forehead; no active bleeding; wound well healed

## 2019-12-05 NOTE — HISTORY OF PRESENT ILLNESS
[de-identified] : Presents for F/U to ER visit - had sustained scalp laceration due to mechanical fall at home; stapled in the ER.  Denies complaints at present.

## 2020-01-06 ENCOUNTER — APPOINTMENT (OUTPATIENT)
Dept: FAMILY MEDICINE | Facility: CLINIC | Age: 85
End: 2020-01-06
Payer: MEDICARE

## 2020-01-06 VITALS
SYSTOLIC BLOOD PRESSURE: 114 MMHG | BODY MASS INDEX: 33.47 KG/M2 | OXYGEN SATURATION: 98 % | HEART RATE: 72 BPM | DIASTOLIC BLOOD PRESSURE: 60 MMHG | WEIGHT: 195 LBS | TEMPERATURE: 97.5 F

## 2020-01-06 DIAGNOSIS — R05 COUGH: ICD-10-CM

## 2020-01-06 DIAGNOSIS — K21.9 GASTRO-ESOPHAGEAL REFLUX DISEASE W/OUT ESOPHAGITIS: ICD-10-CM

## 2020-01-06 PROCEDURE — 99214 OFFICE O/P EST MOD 30 MIN: CPT

## 2020-01-06 RX ORDER — PROMETHAZINE HYDROCHLORIDE AND DEXTROMETHORPHAN HYDROBROMIDE ORAL SOLUTION 15; 6.25 MG/5ML; MG/5ML
6.25-15 SOLUTION ORAL
Qty: 1 | Refills: 1 | Status: ACTIVE | COMMUNITY
Start: 2020-01-06 | End: 1900-01-01

## 2020-01-06 RX ORDER — AZITHROMYCIN 250 MG/1
250 TABLET, FILM COATED ORAL
Qty: 1 | Refills: 0 | Status: ACTIVE | COMMUNITY
Start: 2020-01-06 | End: 1900-01-01

## 2020-01-06 NOTE — PHYSICAL EXAM
[No Acute Distress] : no acute distress [Well Nourished] : well nourished [PERRL] : pupils equal round and reactive to light [Supple] : supple [Clear to Auscultation] : lungs were clear to auscultation bilaterally [Normal S1, S2] : normal S1 and S2 [No Edema] : there was no peripheral edema [Soft] : abdomen soft [Normal Bowel Sounds] : normal bowel sounds [Non Tender] : non-tender [No Rash] : no rash [Normal Gait] : normal gait [Normal Affect] : the affect was normal [de-identified] : erythematous posterior oropharynx, no tonsillar enlargement, no exudates.

## 2020-01-06 NOTE — REVIEW OF SYSTEMS
[Nasal Discharge] : nasal discharge [Postnasal Drip] : postnasal drip [Cough] : cough [Negative] : Heme/Lymph [Earache] : no earache [Hearing Loss] : no hearing loss [Nosebleed] : no nosebleeds [Hoarseness] : no hoarseness [Sore Throat] : no sore throat

## 2020-01-06 NOTE — HISTORY OF PRESENT ILLNESS
[Cold Symptoms] : cold symptoms [Severe] : severe [___ Days ago] : [unfilled] days ago [Gradual] : gradually [Constant] : constant [Congestion] : congestion [Cough] : cough [Fatigue] : fatigue [At Night] : at night [In Morning] : in the morning [Stable] : stable [Sore Throat] : no sore throat [Wheezing] : no wheezing [Chills] : no chills [Anorexia] : no anorexia [Shortness Of Breath] : no shortness of breath [Earache] : no earache [Headache] : no headache [FreeTextEntry5] : none [Fever] : no fever [FreeTextEntry4] : all day [FreeTextEntry6] : unchanged [FreeTextEntry8] : Ms Chelly Moss, presents today for symptoms listed below. Sick contact  with similar symptoms. No recent travel out of country.

## 2020-04-29 ENCOUNTER — RX RENEWAL (OUTPATIENT)
Age: 85
End: 2020-04-29

## 2020-05-21 ENCOUNTER — RX RENEWAL (OUTPATIENT)
Age: 85
End: 2020-05-21

## 2020-06-17 ENCOUNTER — RX RENEWAL (OUTPATIENT)
Age: 85
End: 2020-06-17

## 2020-06-17 RX ORDER — FLUTICASONE PROPIONATE 50 UG/1
50 SPRAY, METERED NASAL DAILY
Qty: 16 | Refills: 3 | Status: ACTIVE | COMMUNITY
Start: 2019-03-27 | End: 1900-01-01

## 2020-08-07 ENCOUNTER — RX RENEWAL (OUTPATIENT)
Age: 85
End: 2020-08-07

## 2020-09-11 ENCOUNTER — RX RENEWAL (OUTPATIENT)
Age: 85
End: 2020-09-11

## 2020-10-28 ENCOUNTER — RX RENEWAL (OUTPATIENT)
Age: 85
End: 2020-10-28

## 2020-11-06 ENCOUNTER — APPOINTMENT (OUTPATIENT)
Dept: FAMILY MEDICINE | Facility: CLINIC | Age: 85
End: 2020-11-06
Payer: MEDICARE

## 2020-11-06 VITALS — DIASTOLIC BLOOD PRESSURE: 70 MMHG | HEART RATE: 76 BPM | RESPIRATION RATE: 20 BRPM | SYSTOLIC BLOOD PRESSURE: 125 MMHG

## 2020-11-06 DIAGNOSIS — B36.9 SUPERFICIAL MYCOSIS, UNSPECIFIED: ICD-10-CM

## 2020-11-06 PROCEDURE — 99214 OFFICE O/P EST MOD 30 MIN: CPT | Mod: 25

## 2020-11-06 PROCEDURE — 36415 COLL VENOUS BLD VENIPUNCTURE: CPT

## 2020-11-06 PROCEDURE — 90686 IIV4 VACC NO PRSV 0.5 ML IM: CPT

## 2020-11-06 PROCEDURE — G0008: CPT

## 2020-11-06 PROCEDURE — 99072 ADDL SUPL MATRL&STAF TM PHE: CPT

## 2020-11-06 NOTE — HISTORY OF PRESENT ILLNESS
[de-identified] : Presents for BP check, labs, and general follow-up.  States doing "OK" - only complain if rash under L breast.  Requests flu vaccine at this encounter

## 2020-11-06 NOTE — PHYSICAL EXAM
[No Acute Distress] : no acute distress [Normal] : normal rate, regular rhythm, normal S1 and S2 and no murmur heard [No Edema] : there was no peripheral edema [Soft] : abdomen soft [Non Tender] : non-tender [Motor Strength Lower Extremities Bilaterally] : there was weakness in both lower extremities [Alert and Oriented x3] : oriented to person, place, and time [de-identified] : did not stand patient; examined in WC [de-identified] : erythematous patch under L breast

## 2020-11-06 NOTE — ASSESSMENT
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

## 2020-11-07 LAB
ALBUMIN SERPL ELPH-MCNC: 4.1 G/DL
ALP BLD-CCNC: 109 U/L
ALT SERPL-CCNC: 8 U/L
ANION GAP SERPL CALC-SCNC: 12 MMOL/L
AST SERPL-CCNC: 13 U/L
BASOPHILS # BLD AUTO: 0.05 K/UL
BASOPHILS NFR BLD AUTO: 0.7 %
BILIRUB SERPL-MCNC: 0.5 MG/DL
BUN SERPL-MCNC: 20 MG/DL
CALCIUM SERPL-MCNC: 9.4 MG/DL
CHLORIDE SERPL-SCNC: 104 MMOL/L
CHOLEST SERPL-MCNC: 127 MG/DL
CO2 SERPL-SCNC: 25 MMOL/L
CREAT SERPL-MCNC: 1.11 MG/DL
EOSINOPHIL # BLD AUTO: 0.12 K/UL
EOSINOPHIL NFR BLD AUTO: 1.7 %
ESTIMATED AVERAGE GLUCOSE: 108 MG/DL
FOLATE SERPL-MCNC: >20 NG/ML
GLUCOSE SERPL-MCNC: 108 MG/DL
HBA1C MFR BLD HPLC: 5.4 %
HCT VFR BLD CALC: 39.7 %
HDLC SERPL-MCNC: 49 MG/DL
HGB BLD-MCNC: 12.6 G/DL
IMM GRANULOCYTES NFR BLD AUTO: 0.4 %
LDLC SERPL CALC-MCNC: 46 MG/DL
LYMPHOCYTES # BLD AUTO: 1.5 K/UL
LYMPHOCYTES NFR BLD AUTO: 20.7 %
MAN DIFF?: NORMAL
MCHC RBC-ENTMCNC: 30.1 PG
MCHC RBC-ENTMCNC: 31.7 GM/DL
MCV RBC AUTO: 95 FL
MONOCYTES # BLD AUTO: 0.45 K/UL
MONOCYTES NFR BLD AUTO: 6.2 %
NEUTROPHILS # BLD AUTO: 5.08 K/UL
NEUTROPHILS NFR BLD AUTO: 70.3 %
NONHDLC SERPL-MCNC: 78 MG/DL
PLATELET # BLD AUTO: 230 K/UL
POTASSIUM SERPL-SCNC: 4.2 MMOL/L
PROT SERPL-MCNC: 6.7 G/DL
RBC # BLD: 4.18 M/UL
RBC # FLD: 12.3 %
SODIUM SERPL-SCNC: 142 MMOL/L
T4 FREE SERPL-MCNC: 1 NG/DL
TRIGL SERPL-MCNC: 158 MG/DL
TSH SERPL-ACNC: 1.82 UIU/ML
VIT B12 SERPL-MCNC: 264 PG/ML
WBC # FLD AUTO: 7.23 K/UL

## 2020-11-25 ENCOUNTER — RX RENEWAL (OUTPATIENT)
Age: 85
End: 2020-11-25

## 2020-12-21 PROBLEM — Z87.09 HISTORY OF ACUTE BRONCHITIS: Status: RESOLVED | Noted: 2019-03-27 | Resolved: 2020-12-21

## 2021-02-12 ENCOUNTER — APPOINTMENT (OUTPATIENT)
Dept: FAMILY MEDICINE | Facility: CLINIC | Age: 86
End: 2021-02-12
Payer: MEDICARE

## 2021-02-12 VITALS
HEART RATE: 76 BPM | HEIGHT: 64 IN | SYSTOLIC BLOOD PRESSURE: 130 MMHG | WEIGHT: 185 LBS | RESPIRATION RATE: 20 BRPM | BODY MASS INDEX: 31.58 KG/M2 | DIASTOLIC BLOOD PRESSURE: 70 MMHG

## 2021-02-12 DIAGNOSIS — K90.49 MALABSORPTION DUE TO INTOLERANCE, NOT ELSEWHERE CLASSIFIED: ICD-10-CM

## 2021-02-12 PROCEDURE — 99214 OFFICE O/P EST MOD 30 MIN: CPT | Mod: 25

## 2021-02-12 PROCEDURE — G0009: CPT

## 2021-02-12 PROCEDURE — 90732 PPSV23 VACC 2 YRS+ SUBQ/IM: CPT

## 2021-02-12 PROCEDURE — 99072 ADDL SUPL MATRL&STAF TM PHE: CPT

## 2021-02-12 PROCEDURE — 36415 COLL VENOUS BLD VENIPUNCTURE: CPT

## 2021-02-12 NOTE — HEALTH RISK ASSESSMENT
[No] : In the past 12 months have you used drugs other than those required for medical reasons? No [No falls in past year] : Patient reported no falls in the past year [0] : 2) Feeling down, depressed, or hopeless: Not at all (0) [Independent] : using telephone [Some assistance needed] : managing finances [With Patient/Caregiver] : With Patient/Caregiver [] : No [Audit-CScore] : 0 [AdvancecareDate] : 02/21 [FreeTextEntry4] : in progress

## 2021-02-12 NOTE — COUNSELING
[Fall prevention counseling provided] : Fall prevention counseling provided [de-identified] : Healthy eating and activities [Limited decision making ability] : Limited decision making ability [Needs reinforcement, provided] : Patient needs reinforcement on understanding of lifestyle changes and steps needed to achieve self management goal; reinforcement was provided

## 2021-02-12 NOTE — PHYSICAL EXAM
[No Acute Distress] : no acute distress [No Edema] : there was no peripheral edema [Normal] : soft, non-tender, non-distended, no masses palpated, no HSM and normal bowel sounds [Normal Posterior Cervical Nodes] : no posterior cervical lymphadenopathy [Normal Anterior Cervical Nodes] : no anterior cervical lymphadenopathy [Motor Strength Lower Extremities Bilaterally] : there was weakness in both lower extremities [No Focal Deficits] : no focal deficits [Speech Grossly Normal] : speech grossly normal [Normal Affect] : the affect was normal [Normal Mood] : the mood was normal [de-identified] : mild confusion noted

## 2021-02-12 NOTE — HISTORY OF PRESENT ILLNESS
[de-identified] : Presents for BP check, labs, and general follow-up.  Daughter in attendance.  Daughter states there has been some increase in confusion, otherwise no new issues.  States has to be encouraged to eat (note wt loss since last visit).  Reviewed immunizations - needs updated Pneumovax - pt and daughter in agreement.

## 2021-02-12 NOTE — ASSESSMENT
[FreeTextEntry1] : Hemodynamically stable with acceptable BP\par No clinical evidence of thyroid dysfunction\par Lab profiles drawn in office and sent\par Pneumovax given L deltoid

## 2021-02-15 LAB
ALBUMIN SERPL ELPH-MCNC: 4.2 G/DL
ALP BLD-CCNC: 121 U/L
ALT SERPL-CCNC: 6 U/L
ANION GAP SERPL CALC-SCNC: 14 MMOL/L
AST SERPL-CCNC: 13 U/L
BASOPHILS # BLD AUTO: 0.04 K/UL
BASOPHILS NFR BLD AUTO: 0.6 %
BILIRUB SERPL-MCNC: 0.6 MG/DL
BUN SERPL-MCNC: 21 MG/DL
CALCIUM SERPL-MCNC: 9.3 MG/DL
CHLORIDE SERPL-SCNC: 104 MMOL/L
CHOLEST SERPL-MCNC: 131 MG/DL
CO2 SERPL-SCNC: 24 MMOL/L
CREAT SERPL-MCNC: 1.22 MG/DL
EOSINOPHIL # BLD AUTO: 0.11 K/UL
EOSINOPHIL NFR BLD AUTO: 1.6 %
ESTIMATED AVERAGE GLUCOSE: 111 MG/DL
FOLATE SERPL-MCNC: >20 NG/ML
GLUCOSE SERPL-MCNC: 148 MG/DL
HBA1C MFR BLD HPLC: 5.5 %
HCT VFR BLD CALC: 42.6 %
HDLC SERPL-MCNC: 47 MG/DL
HGB BLD-MCNC: 13.2 G/DL
IMM GRANULOCYTES NFR BLD AUTO: 0.6 %
LDLC SERPL CALC-MCNC: 50 MG/DL
LYMPHOCYTES # BLD AUTO: 1.31 K/UL
LYMPHOCYTES NFR BLD AUTO: 18.7 %
MAN DIFF?: NORMAL
MCHC RBC-ENTMCNC: 30.1 PG
MCHC RBC-ENTMCNC: 31 GM/DL
MCV RBC AUTO: 97.3 FL
MONOCYTES # BLD AUTO: 0.34 K/UL
MONOCYTES NFR BLD AUTO: 4.8 %
NEUTROPHILS # BLD AUTO: 5.18 K/UL
NEUTROPHILS NFR BLD AUTO: 73.7 %
NONHDLC SERPL-MCNC: 84 MG/DL
PLATELET # BLD AUTO: 267 K/UL
POTASSIUM SERPL-SCNC: 4.5 MMOL/L
PROT SERPL-MCNC: 7.2 G/DL
RBC # BLD: 4.38 M/UL
RBC # FLD: 13.2 %
SODIUM SERPL-SCNC: 143 MMOL/L
T4 FREE SERPL-MCNC: 1.1 NG/DL
TRIGL SERPL-MCNC: 171 MG/DL
TSH SERPL-ACNC: 2.09 UIU/ML
VIT B12 SERPL-MCNC: 248 PG/ML
WBC # FLD AUTO: 7.02 K/UL

## 2021-05-26 ENCOUNTER — RX RENEWAL (OUTPATIENT)
Age: 86
End: 2021-05-26

## 2021-06-25 ENCOUNTER — RX RENEWAL (OUTPATIENT)
Age: 86
End: 2021-06-25

## 2021-09-21 ENCOUNTER — RX RENEWAL (OUTPATIENT)
Age: 86
End: 2021-09-21

## 2021-11-26 ENCOUNTER — RX RENEWAL (OUTPATIENT)
Age: 86
End: 2021-11-26

## 2021-12-14 ENCOUNTER — RX RENEWAL (OUTPATIENT)
Age: 86
End: 2021-12-14

## 2021-12-14 RX ORDER — CLOTRIMAZOLE AND BETAMETHASONE DIPROPIONATE 10; .5 MG/G; MG/G
1-0.05 CREAM TOPICAL TWICE DAILY
Qty: 45 | Refills: 3 | Status: ACTIVE | COMMUNITY
Start: 2020-11-06 | End: 1900-01-01

## 2022-01-27 NOTE — ED ADULT NURSE NOTE - TEMPLATE LIST FOR HEAD TO TOE ASSESSMENT
Fall Rituxan Counseling:  I discussed with the patient the risks of Rituxan infusions. Side effects can include infusion reactions, severe drug rashes including mucocutaneous reactions, reactivation of latent hepatitis and other infections and rarely progressive multifocal leukoencephalopathy.  All of the patient's questions and concerns were addressed.

## 2022-02-23 ENCOUNTER — RX RENEWAL (OUTPATIENT)
Age: 87
End: 2022-02-23

## 2022-04-29 ENCOUNTER — RX RENEWAL (OUTPATIENT)
Age: 87
End: 2022-04-29

## 2022-06-08 ENCOUNTER — RX RENEWAL (OUTPATIENT)
Age: 87
End: 2022-06-08

## 2022-06-16 ENCOUNTER — RX RENEWAL (OUTPATIENT)
Age: 87
End: 2022-06-16

## 2022-07-28 ENCOUNTER — RX RENEWAL (OUTPATIENT)
Age: 87
End: 2022-07-28

## 2022-09-09 ENCOUNTER — APPOINTMENT (OUTPATIENT)
Dept: FAMILY MEDICINE | Facility: CLINIC | Age: 87
End: 2022-09-09

## 2022-09-09 VITALS
DIASTOLIC BLOOD PRESSURE: 70 MMHG | WEIGHT: 181 LBS | RESPIRATION RATE: 20 BRPM | SYSTOLIC BLOOD PRESSURE: 126 MMHG | HEIGHT: 64 IN | HEART RATE: 76 BPM | BODY MASS INDEX: 30.9 KG/M2

## 2022-09-09 DIAGNOSIS — D64.9 ANEMIA, UNSPECIFIED: ICD-10-CM

## 2022-09-09 DIAGNOSIS — Z23 ENCOUNTER FOR IMMUNIZATION: ICD-10-CM

## 2022-09-09 DIAGNOSIS — Z00.00 ENCOUNTER FOR GENERAL ADULT MEDICAL EXAMINATION W/OUT ABNORMAL FINDINGS: ICD-10-CM

## 2022-09-09 PROCEDURE — 36415 COLL VENOUS BLD VENIPUNCTURE: CPT

## 2022-09-09 PROCEDURE — 90662 IIV NO PRSV INCREASED AG IM: CPT

## 2022-09-09 PROCEDURE — G0438: CPT

## 2022-09-09 PROCEDURE — 93000 ELECTROCARDIOGRAM COMPLETE: CPT | Mod: 59

## 2022-09-09 PROCEDURE — G0008: CPT

## 2022-09-09 NOTE — COUNSELING
[None] : None [Good understanding] : Patient has a good understanding of lifestyle changes and steps needed to achieve self management goal [Benefits of weight loss discussed] : Benefits of weight loss discussed [Encouraged to increase physical activity] : Encouraged to increase physical activity [Needs reinforcement, provided] : Patient needs reinforcement on understanding of disease, goals and obesity follow-up plan; reinforcement was provided [de-identified] : Healthy eating and activities

## 2022-09-09 NOTE — HISTORY OF PRESENT ILLNESS
[FreeTextEntry1] : Requests comprehensive exam [de-identified] : Presents for comprehensive exam.   and daughter are present.  States feeling generally well.  Reviewed all medication with daughter; reviewed screening and immunizations and offered current flu vaccine - pt in agreement.

## 2022-09-09 NOTE — ASSESSMENT
[FreeTextEntry1] : Hemodynamically stable with acceptable BP\par Lab profiles drawn in office and sent\par High dose flu vaccine given L deltoid

## 2022-09-09 NOTE — PHYSICAL EXAM
[Normal Posterior Cervical Nodes] : no posterior cervical lymphadenopathy [Normal Anterior Cervical Nodes] : no anterior cervical lymphadenopathy [Normal] : no CVA or spinal tenderness [Ataxic] : ataxic [Motor Strength Lower Extremities Bilaterally] : there was weakness in both lower extremities [No Focal Deficits] : no focal deficits [Limited Balance] : the patient's balance was impaired [de-identified] : senile purpura noted [de-identified] : mild confusion noted

## 2022-09-10 LAB
ALBUMIN SERPL ELPH-MCNC: 4 G/DL
ALP BLD-CCNC: 99 U/L
ALT SERPL-CCNC: 8 U/L
ANION GAP SERPL CALC-SCNC: 14 MMOL/L
AST SERPL-CCNC: 19 U/L
BASOPHILS # BLD AUTO: 0.04 K/UL
BASOPHILS NFR BLD AUTO: 0.5 %
BILIRUB SERPL-MCNC: 0.6 MG/DL
BUN SERPL-MCNC: 17 MG/DL
CALCIUM SERPL-MCNC: 9.4 MG/DL
CHLORIDE SERPL-SCNC: 106 MMOL/L
CHOLEST SERPL-MCNC: 129 MG/DL
CO2 SERPL-SCNC: 22 MMOL/L
CREAT SERPL-MCNC: 1.02 MG/DL
EGFR: 54 ML/MIN/1.73M2
EOSINOPHIL # BLD AUTO: 0.11 K/UL
EOSINOPHIL NFR BLD AUTO: 1.3 %
ESTIMATED AVERAGE GLUCOSE: 108 MG/DL
FOLATE SERPL-MCNC: >20 NG/ML
GLUCOSE SERPL-MCNC: 127 MG/DL
HBA1C MFR BLD HPLC: 5.4 %
HCT VFR BLD CALC: 39.6 %
HDLC SERPL-MCNC: 52 MG/DL
HGB BLD-MCNC: 12.6 G/DL
IMM GRANULOCYTES NFR BLD AUTO: 0.5 %
LDLC SERPL CALC-MCNC: 39 MG/DL
LYMPHOCYTES # BLD AUTO: 1.8 K/UL
LYMPHOCYTES NFR BLD AUTO: 21.7 %
MAN DIFF?: NORMAL
MCHC RBC-ENTMCNC: 30.7 PG
MCHC RBC-ENTMCNC: 31.8 GM/DL
MCV RBC AUTO: 96.6 FL
MONOCYTES # BLD AUTO: 0.46 K/UL
MONOCYTES NFR BLD AUTO: 5.5 %
NEUTROPHILS # BLD AUTO: 5.86 K/UL
NEUTROPHILS NFR BLD AUTO: 70.5 %
NONHDLC SERPL-MCNC: 77 MG/DL
PLATELET # BLD AUTO: 298 K/UL
POTASSIUM SERPL-SCNC: 4.5 MMOL/L
PROT SERPL-MCNC: 6.9 G/DL
RBC # BLD: 4.1 M/UL
RBC # FLD: 12.5 %
SODIUM SERPL-SCNC: 143 MMOL/L
T4 FREE SERPL-MCNC: 1.2 NG/DL
TRIGL SERPL-MCNC: 189 MG/DL
TSH SERPL-ACNC: 1.3 UIU/ML
VIT B12 SERPL-MCNC: 604 PG/ML
WBC # FLD AUTO: 8.31 K/UL

## 2022-10-23 ENCOUNTER — RX RENEWAL (OUTPATIENT)
Age: 87
End: 2022-10-23

## 2022-11-22 ENCOUNTER — RX RENEWAL (OUTPATIENT)
Age: 87
End: 2022-11-22

## 2022-11-28 ENCOUNTER — RX RENEWAL (OUTPATIENT)
Age: 87
End: 2022-11-28

## 2023-01-19 ENCOUNTER — RX RENEWAL (OUTPATIENT)
Age: 88
End: 2023-01-19

## 2023-04-28 ENCOUNTER — RX RENEWAL (OUTPATIENT)
Age: 88
End: 2023-04-28

## 2023-06-09 ENCOUNTER — RX RENEWAL (OUTPATIENT)
Age: 88
End: 2023-06-09

## 2023-06-21 ENCOUNTER — OUTPATIENT (OUTPATIENT)
Dept: OUTPATIENT SERVICES | Facility: HOSPITAL | Age: 88
LOS: 1 days | End: 2023-06-21
Payer: COMMERCIAL

## 2023-06-21 ENCOUNTER — APPOINTMENT (OUTPATIENT)
Dept: MRI IMAGING | Facility: HOSPITAL | Age: 88
End: 2023-06-21
Payer: MEDICARE

## 2023-06-21 DIAGNOSIS — R42 DIZZINESS AND GIDDINESS: ICD-10-CM

## 2023-06-21 DIAGNOSIS — R44.3 HALLUCINATIONS, UNSPECIFIED: ICD-10-CM

## 2023-06-21 PROCEDURE — 70551 MRI BRAIN STEM W/O DYE: CPT | Mod: 26

## 2023-06-21 PROCEDURE — 70551 MRI BRAIN STEM W/O DYE: CPT

## 2023-07-25 ENCOUNTER — RX RENEWAL (OUTPATIENT)
Age: 88
End: 2023-07-25

## 2023-08-07 ENCOUNTER — RX RENEWAL (OUTPATIENT)
Age: 88
End: 2023-08-07

## 2023-09-27 ENCOUNTER — RX RENEWAL (OUTPATIENT)
Age: 88
End: 2023-09-27

## 2023-10-28 ENCOUNTER — RX RENEWAL (OUTPATIENT)
Age: 88
End: 2023-10-28

## 2023-11-22 ENCOUNTER — RX RENEWAL (OUTPATIENT)
Age: 88
End: 2023-11-22

## 2023-12-23 ENCOUNTER — RX RENEWAL (OUTPATIENT)
Age: 88
End: 2023-12-23

## 2023-12-29 ENCOUNTER — RX RENEWAL (OUTPATIENT)
Age: 88
End: 2023-12-29

## 2024-02-01 ENCOUNTER — APPOINTMENT (OUTPATIENT)
Dept: FAMILY MEDICINE | Facility: CLINIC | Age: 89
End: 2024-02-01
Payer: MEDICARE

## 2024-02-01 ENCOUNTER — RX RENEWAL (OUTPATIENT)
Age: 89
End: 2024-02-01

## 2024-02-01 VITALS
HEART RATE: 76 BPM | DIASTOLIC BLOOD PRESSURE: 70 MMHG | BODY MASS INDEX: 30.9 KG/M2 | RESPIRATION RATE: 20 BRPM | WEIGHT: 181 LBS | HEIGHT: 64 IN | SYSTOLIC BLOOD PRESSURE: 130 MMHG

## 2024-02-01 DIAGNOSIS — F03.90 UNSPECIFIED DEMENTIA W/OUT BEHAVIORAL DISTURBANCE: ICD-10-CM

## 2024-02-01 DIAGNOSIS — I10 ESSENTIAL (PRIMARY) HYPERTENSION: ICD-10-CM

## 2024-02-01 DIAGNOSIS — E78.5 HYPERLIPIDEMIA, UNSPECIFIED: ICD-10-CM

## 2024-02-01 DIAGNOSIS — R73.01 IMPAIRED FASTING GLUCOSE: ICD-10-CM

## 2024-02-01 DIAGNOSIS — E03.9 HYPOTHYROIDISM, UNSPECIFIED: ICD-10-CM

## 2024-02-01 PROCEDURE — 36415 COLL VENOUS BLD VENIPUNCTURE: CPT

## 2024-02-01 PROCEDURE — 99214 OFFICE O/P EST MOD 30 MIN: CPT | Mod: 25

## 2024-02-01 RX ORDER — MEMANTINE HYDROCHLORIDE 5 MG/1
5 TABLET, FILM COATED ORAL
Qty: 180 | Refills: 1 | Status: ACTIVE | COMMUNITY
Start: 2024-02-01

## 2024-02-01 RX ORDER — QUETIAPINE FUMARATE 25 MG/1
25 TABLET ORAL TWICE DAILY
Refills: 0 | Status: ACTIVE | COMMUNITY
Start: 2024-02-01

## 2024-02-01 NOTE — HISTORY OF PRESENT ILLNESS
[de-identified] : Presents with daughter after an extended period of time for BP check, labs, and general follow-up.  Pt has seen Neurology in the interim (Dr. Maloney) - note new medication for dementia with behavioral disturbance.  Daughter states medication is working well.  Pt states feeling well.

## 2024-02-01 NOTE — PHYSICAL EXAM
[No Acute Distress] : no acute distress [No Edema] : there was no peripheral edema [Normal] : soft, non-tender, non-distended, no masses palpated, no HSM and normal bowel sounds [Normal Posterior Cervical Nodes] : no posterior cervical lymphadenopathy [Normal Anterior Cervical Nodes] : no anterior cervical lymphadenopathy [No Focal Deficits] : no focal deficits [de-identified] : mild confusion noted

## 2024-02-02 LAB
ALBUMIN SERPL ELPH-MCNC: 4 G/DL
ALP BLD-CCNC: 120 U/L
ALT SERPL-CCNC: 11 U/L
ANION GAP SERPL CALC-SCNC: 11 MMOL/L
AST SERPL-CCNC: 14 U/L
BILIRUB SERPL-MCNC: 0.6 MG/DL
BUN SERPL-MCNC: 22 MG/DL
CALCIUM SERPL-MCNC: 9.2 MG/DL
CHLORIDE SERPL-SCNC: 107 MMOL/L
CHOLEST SERPL-MCNC: 118 MG/DL
CO2 SERPL-SCNC: 26 MMOL/L
CREAT SERPL-MCNC: 1.16 MG/DL
EGFR: 46 ML/MIN/1.73M2
ESTIMATED AVERAGE GLUCOSE: 100 MG/DL
FOLATE SERPL-MCNC: >20 NG/ML
GLUCOSE SERPL-MCNC: 115 MG/DL
HBA1C MFR BLD HPLC: 5.1 %
HCT VFR BLD CALC: 37.1 %
HDLC SERPL-MCNC: 49 MG/DL
HGB BLD-MCNC: 12 G/DL
LDLC SERPL CALC-MCNC: 46 MG/DL
MCHC RBC-ENTMCNC: 31.8 PG
MCHC RBC-ENTMCNC: 32.3 GM/DL
MCV RBC AUTO: 98.4 FL
NONHDLC SERPL-MCNC: 69 MG/DL
PLATELET # BLD AUTO: 231 K/UL
POTASSIUM SERPL-SCNC: 4.2 MMOL/L
PROT SERPL-MCNC: 6.7 G/DL
RBC # BLD: 3.77 M/UL
RBC # FLD: 12.6 %
SODIUM SERPL-SCNC: 144 MMOL/L
T4 FREE SERPL-MCNC: 1 NG/DL
TRIGL SERPL-MCNC: 131 MG/DL
TSH SERPL-ACNC: 1.31 UIU/ML
VIT B12 SERPL-MCNC: 568 PG/ML
WBC # FLD AUTO: 7.09 K/UL

## 2024-02-22 ENCOUNTER — RX RENEWAL (OUTPATIENT)
Age: 89
End: 2024-02-22

## 2024-02-22 RX ORDER — PANTOPRAZOLE 40 MG/1
40 TABLET, DELAYED RELEASE ORAL
Qty: 90 | Refills: 3 | Status: ACTIVE | COMMUNITY
Start: 2019-09-16 | End: 1900-01-01

## 2024-02-22 RX ORDER — FOLIC ACID 1 MG/1
1 TABLET ORAL
Qty: 90 | Refills: 3 | Status: ACTIVE | COMMUNITY
Start: 2019-02-08 | End: 1900-01-01

## 2024-03-02 ENCOUNTER — RX RENEWAL (OUTPATIENT)
Age: 89
End: 2024-03-02

## 2024-03-25 ENCOUNTER — RX RENEWAL (OUTPATIENT)
Age: 89
End: 2024-03-25

## 2024-03-25 RX ORDER — LEVOTHYROXINE SODIUM 0.03 MG/1
25 TABLET ORAL
Qty: 90 | Refills: 0 | Status: ACTIVE | COMMUNITY
Start: 2017-04-24 | End: 1900-01-01

## 2024-03-30 ENCOUNTER — RX RENEWAL (OUTPATIENT)
Age: 89
End: 2024-03-30

## 2024-04-15 ENCOUNTER — INPATIENT (INPATIENT)
Facility: HOSPITAL | Age: 89
LOS: 6 days | Discharge: HOME | DRG: 536 | End: 2024-04-22
Attending: STUDENT IN AN ORGANIZED HEALTH CARE EDUCATION/TRAINING PROGRAM | Admitting: STUDENT IN AN ORGANIZED HEALTH CARE EDUCATION/TRAINING PROGRAM
Payer: COMMERCIAL

## 2024-04-15 VITALS
TEMPERATURE: 97 F | SYSTOLIC BLOOD PRESSURE: 159 MMHG | HEART RATE: 68 BPM | WEIGHT: 181 LBS | OXYGEN SATURATION: 98 % | HEIGHT: 65 IN | RESPIRATION RATE: 17 BRPM | DIASTOLIC BLOOD PRESSURE: 75 MMHG

## 2024-04-15 LAB
ALBUMIN SERPL ELPH-MCNC: 3.4 G/DL — SIGNIFICANT CHANGE UP (ref 3.3–5)
ALP SERPL-CCNC: 102 U/L — SIGNIFICANT CHANGE UP (ref 40–120)
ALT FLD-CCNC: 19 U/L — SIGNIFICANT CHANGE UP (ref 10–45)
ANION GAP SERPL CALC-SCNC: 8 MMOL/L — SIGNIFICANT CHANGE UP (ref 5–17)
APTT BLD: 31.2 SEC — SIGNIFICANT CHANGE UP (ref 24.5–35.6)
AST SERPL-CCNC: 29 U/L — SIGNIFICANT CHANGE UP (ref 10–40)
BASOPHILS # BLD AUTO: 0.05 K/UL — SIGNIFICANT CHANGE UP (ref 0–0.2)
BASOPHILS NFR BLD AUTO: 0.4 % — SIGNIFICANT CHANGE UP (ref 0–2)
BILIRUB SERPL-MCNC: 0.9 MG/DL — SIGNIFICANT CHANGE UP (ref 0.2–1.2)
BLD GP AB SCN SERPL QL: SIGNIFICANT CHANGE UP
BUN SERPL-MCNC: 29 MG/DL — HIGH (ref 7–23)
CALCIUM SERPL-MCNC: 8.9 MG/DL — SIGNIFICANT CHANGE UP (ref 8.4–10.5)
CHLORIDE SERPL-SCNC: 106 MMOL/L — SIGNIFICANT CHANGE UP (ref 96–108)
CO2 SERPL-SCNC: 29 MMOL/L — SIGNIFICANT CHANGE UP (ref 22–31)
CREAT SERPL-MCNC: 1.42 MG/DL — HIGH (ref 0.5–1.3)
EGFR: 36 ML/MIN/1.73M2 — LOW
EOSINOPHIL # BLD AUTO: 0.05 K/UL — SIGNIFICANT CHANGE UP (ref 0–0.5)
EOSINOPHIL NFR BLD AUTO: 0.4 % — SIGNIFICANT CHANGE UP (ref 0–6)
GLUCOSE SERPL-MCNC: 119 MG/DL — HIGH (ref 70–99)
HCT VFR BLD CALC: 37.6 % — SIGNIFICANT CHANGE UP (ref 34.5–45)
HGB BLD-MCNC: 12.7 G/DL — SIGNIFICANT CHANGE UP (ref 11.5–15.5)
IMM GRANULOCYTES NFR BLD AUTO: 0.8 % — SIGNIFICANT CHANGE UP (ref 0–0.9)
INR BLD: 1.04 RATIO — SIGNIFICANT CHANGE UP (ref 0.85–1.18)
LACTATE SERPL-SCNC: 1.4 MMOL/L — SIGNIFICANT CHANGE UP (ref 0.7–2)
LYMPHOCYTES # BLD AUTO: 1.86 K/UL — SIGNIFICANT CHANGE UP (ref 1–3.3)
LYMPHOCYTES # BLD AUTO: 16 % — SIGNIFICANT CHANGE UP (ref 13–44)
MCHC RBC-ENTMCNC: 32.6 PG — SIGNIFICANT CHANGE UP (ref 27–34)
MCHC RBC-ENTMCNC: 33.8 GM/DL — SIGNIFICANT CHANGE UP (ref 32–36)
MCV RBC AUTO: 96.4 FL — SIGNIFICANT CHANGE UP (ref 80–100)
MONOCYTES # BLD AUTO: 0.65 K/UL — SIGNIFICANT CHANGE UP (ref 0–0.9)
MONOCYTES NFR BLD AUTO: 5.6 % — SIGNIFICANT CHANGE UP (ref 2–14)
NEUTROPHILS # BLD AUTO: 8.96 K/UL — HIGH (ref 1.8–7.4)
NEUTROPHILS NFR BLD AUTO: 76.8 % — SIGNIFICANT CHANGE UP (ref 43–77)
NRBC # BLD: 0 /100 WBCS — SIGNIFICANT CHANGE UP (ref 0–0)
PLATELET # BLD AUTO: 252 K/UL — SIGNIFICANT CHANGE UP (ref 150–400)
POTASSIUM SERPL-MCNC: 4.7 MMOL/L — SIGNIFICANT CHANGE UP (ref 3.5–5.3)
POTASSIUM SERPL-SCNC: 4.7 MMOL/L — SIGNIFICANT CHANGE UP (ref 3.5–5.3)
PROT SERPL-MCNC: 7.5 G/DL — SIGNIFICANT CHANGE UP (ref 6–8.3)
PROTHROM AB SERPL-ACNC: 12.2 SEC — SIGNIFICANT CHANGE UP (ref 9.5–13)
RBC # BLD: 3.9 M/UL — SIGNIFICANT CHANGE UP (ref 3.8–5.2)
RBC # FLD: 12.3 % — SIGNIFICANT CHANGE UP (ref 10.3–14.5)
SODIUM SERPL-SCNC: 143 MMOL/L — SIGNIFICANT CHANGE UP (ref 135–145)
WBC # BLD: 11.66 K/UL — HIGH (ref 3.8–10.5)
WBC # FLD AUTO: 11.66 K/UL — HIGH (ref 3.8–10.5)

## 2024-04-15 PROCEDURE — 71045 X-RAY EXAM CHEST 1 VIEW: CPT | Mod: 26

## 2024-04-15 PROCEDURE — 72192 CT PELVIS W/O DYE: CPT | Mod: 26,MC

## 2024-04-15 PROCEDURE — 73501 X-RAY EXAM HIP UNI 1 VIEW: CPT | Mod: 26,LT

## 2024-04-15 PROCEDURE — 73552 X-RAY EXAM OF FEMUR 2/>: CPT | Mod: 26,LT

## 2024-04-15 PROCEDURE — 76376 3D RENDER W/INTRP POSTPROCES: CPT | Mod: 26

## 2024-04-15 PROCEDURE — 73562 X-RAY EXAM OF KNEE 3: CPT | Mod: 26,LT

## 2024-04-15 PROCEDURE — 99285 EMERGENCY DEPT VISIT HI MDM: CPT

## 2024-04-15 RX ORDER — MORPHINE SULFATE 50 MG/1
2 CAPSULE, EXTENDED RELEASE ORAL ONCE
Refills: 0 | Status: DISCONTINUED | OUTPATIENT
Start: 2024-04-15 | End: 2024-04-15

## 2024-04-15 RX ADMIN — MORPHINE SULFATE 2 MILLIGRAM(S): 50 CAPSULE, EXTENDED RELEASE ORAL at 21:28

## 2024-04-15 RX ADMIN — MORPHINE SULFATE 2 MILLIGRAM(S): 50 CAPSULE, EXTENDED RELEASE ORAL at 23:00

## 2024-04-15 NOTE — ED PROVIDER NOTE - OBJECTIVE STATEMENT
Patient status post mechanical trip and fall at her home.  Patient complaining of left hip pain that is worse with any movement.  Patient denies any head trauma.  Patient is only on baby aspirin for blood thinner.  Patient denies any other injuries.

## 2024-04-15 NOTE — ED ADULT NURSE REASSESSMENT NOTE - NS ED NURSE REASSESS COMMENT FT1
Received report from day shift RN. Patient received A&Ox3, disoriented to time, with 20G IV. Pt offering no complaints and is in no acute distress at this time.  Respirations even and unlabored.  Stretcher in lowest position, wheels locked, side rails up and call light in reach. Awaiting imaging results.

## 2024-04-15 NOTE — ED ADULT NURSE NOTE - NSFALLHARMRISKINTERV_ED_ALL_ED
Assistance OOB with selected safe patient handling equipment if applicable/Communicate risk of Fall with Harm to all staff, patient, and family/Provide patient with walking aids/Provide visual cue: red socks, yellow wristband, yellow gown, etc/Reinforce activity limits and safety measures with patient and family/Bed in lowest position, wheels locked, appropriate side rails in place/Call bell, personal items and telephone in reach/Instruct patient to call for assistance before getting out of bed/chair/stretcher/Non-slip footwear applied when patient is off stretcher/[place] to call system/Physically safe environment - no spills, clutter or unnecessary equipment/Purposeful Proactive Rounding/Room/bathroom lighting operational, light cord in reach

## 2024-04-15 NOTE — ED ADULT NURSE NOTE - OBJECTIVE STATEMENT
Pt BIB EMS s/p unwitnessed fall with left hip pain. Pt with hx alzheimer's disease and a poor historian. All hx provided by daughter. Pts daughter stating that she found pt on the floor when she got home with c/o left hip pain. Pt denies head injury or LOC. Pt with (+) ASA use daily. Denies any other complaints.

## 2024-04-15 NOTE — ED PROVIDER NOTE - NS ED MD DISPO SPECIAL CONSIDERATION1
What Type Of Note Output Would You Prefer (Optional)?: Standard Output
Hpi Title: Evaluation of Skin Lesions
Family Member: Brother
None

## 2024-04-15 NOTE — ED ADULT TRIAGE NOTE - CHIEF COMPLAINT QUOTE
BIB EMS from home for c/o slip and fall with left hip pain just PTA. Denies any head strike or LOC. pt is on ASA. pt with PMH of alzheimer's, daughter at bedside.

## 2024-04-16 ENCOUNTER — RESULT REVIEW (OUTPATIENT)
Age: 89
End: 2024-04-16

## 2024-04-16 DIAGNOSIS — S72.002A FRACTURE OF UNSPECIFIED PART OF NECK OF LEFT FEMUR, INITIAL ENCOUNTER FOR CLOSED FRACTURE: ICD-10-CM

## 2024-04-16 DIAGNOSIS — Z98.890 OTHER SPECIFIED POSTPROCEDURAL STATES: Chronic | ICD-10-CM

## 2024-04-16 LAB
ALBUMIN SERPL ELPH-MCNC: 3.1 G/DL — LOW (ref 3.3–5)
ALP SERPL-CCNC: 98 U/L — SIGNIFICANT CHANGE UP (ref 40–120)
ALT FLD-CCNC: 14 U/L — SIGNIFICANT CHANGE UP (ref 10–45)
ANION GAP SERPL CALC-SCNC: 5 MMOL/L — SIGNIFICANT CHANGE UP (ref 5–17)
APPEARANCE UR: CLEAR — SIGNIFICANT CHANGE UP
AST SERPL-CCNC: 15 U/L — SIGNIFICANT CHANGE UP (ref 10–40)
BACTERIA # UR AUTO: ABNORMAL /HPF
BASOPHILS # BLD AUTO: 0.03 K/UL — SIGNIFICANT CHANGE UP (ref 0–0.2)
BASOPHILS NFR BLD AUTO: 0.4 % — SIGNIFICANT CHANGE UP (ref 0–2)
BILIRUB SERPL-MCNC: 0.8 MG/DL — SIGNIFICANT CHANGE UP (ref 0.2–1.2)
BILIRUB UR-MCNC: NEGATIVE — SIGNIFICANT CHANGE UP
BUN SERPL-MCNC: 22 MG/DL — SIGNIFICANT CHANGE UP (ref 7–23)
CALCIUM SERPL-MCNC: 8.7 MG/DL — SIGNIFICANT CHANGE UP (ref 8.4–10.5)
CHLORIDE SERPL-SCNC: 104 MMOL/L — SIGNIFICANT CHANGE UP (ref 96–108)
CK SERPL-CCNC: 127 U/L — SIGNIFICANT CHANGE UP (ref 25–170)
CO2 SERPL-SCNC: 32 MMOL/L — HIGH (ref 22–31)
COLOR SPEC: YELLOW — SIGNIFICANT CHANGE UP
CREAT SERPL-MCNC: 1.23 MG/DL — SIGNIFICANT CHANGE UP (ref 0.5–1.3)
DIFF PNL FLD: ABNORMAL
EGFR: 43 ML/MIN/1.73M2 — LOW
EOSINOPHIL # BLD AUTO: 0.07 K/UL — SIGNIFICANT CHANGE UP (ref 0–0.5)
EOSINOPHIL NFR BLD AUTO: 0.9 % — SIGNIFICANT CHANGE UP (ref 0–6)
EPI CELLS # UR: 0 — SIGNIFICANT CHANGE UP
FOLATE SERPL-MCNC: >20 NG/ML — SIGNIFICANT CHANGE UP
GLUCOSE SERPL-MCNC: 101 MG/DL — HIGH (ref 70–99)
GLUCOSE UR QL: NEGATIVE MG/DL — SIGNIFICANT CHANGE UP
HCT VFR BLD CALC: 35.9 % — SIGNIFICANT CHANGE UP (ref 34.5–45)
HGB BLD-MCNC: 12.2 G/DL — SIGNIFICANT CHANGE UP (ref 11.5–15.5)
IMM GRANULOCYTES NFR BLD AUTO: 0.5 % — SIGNIFICANT CHANGE UP (ref 0–0.9)
KETONES UR-MCNC: NEGATIVE MG/DL — SIGNIFICANT CHANGE UP
LEUKOCYTE ESTERASE UR-ACNC: ABNORMAL
LYMPHOCYTES # BLD AUTO: 1.74 K/UL — SIGNIFICANT CHANGE UP (ref 1–3.3)
LYMPHOCYTES # BLD AUTO: 21.9 % — SIGNIFICANT CHANGE UP (ref 13–44)
MAGNESIUM SERPL-MCNC: 1.5 MG/DL — LOW (ref 1.6–2.6)
MCHC RBC-ENTMCNC: 32.3 PG — SIGNIFICANT CHANGE UP (ref 27–34)
MCHC RBC-ENTMCNC: 34 GM/DL — SIGNIFICANT CHANGE UP (ref 32–36)
MCV RBC AUTO: 95 FL — SIGNIFICANT CHANGE UP (ref 80–100)
MONOCYTES # BLD AUTO: 0.5 K/UL — SIGNIFICANT CHANGE UP (ref 0–0.9)
MONOCYTES NFR BLD AUTO: 6.3 % — SIGNIFICANT CHANGE UP (ref 2–14)
NEUTROPHILS # BLD AUTO: 5.58 K/UL — SIGNIFICANT CHANGE UP (ref 1.8–7.4)
NEUTROPHILS NFR BLD AUTO: 70 % — SIGNIFICANT CHANGE UP (ref 43–77)
NITRITE UR-MCNC: NEGATIVE — SIGNIFICANT CHANGE UP
NRBC # BLD: 0 /100 WBCS — SIGNIFICANT CHANGE UP (ref 0–0)
PH UR: 6 — SIGNIFICANT CHANGE UP (ref 5–8)
PHOSPHATE SERPL-MCNC: 3.2 MG/DL — SIGNIFICANT CHANGE UP (ref 2.5–4.5)
PLATELET # BLD AUTO: 201 K/UL — SIGNIFICANT CHANGE UP (ref 150–400)
POTASSIUM SERPL-MCNC: 3.9 MMOL/L — SIGNIFICANT CHANGE UP (ref 3.5–5.3)
POTASSIUM SERPL-SCNC: 3.9 MMOL/L — SIGNIFICANT CHANGE UP (ref 3.5–5.3)
PROT SERPL-MCNC: 6.6 G/DL — SIGNIFICANT CHANGE UP (ref 6–8.3)
PROT UR-MCNC: NEGATIVE MG/DL — SIGNIFICANT CHANGE UP
RAPID RVP RESULT: SIGNIFICANT CHANGE UP
RBC # BLD: 3.78 M/UL — LOW (ref 3.8–5.2)
RBC # FLD: 12.1 % — SIGNIFICANT CHANGE UP (ref 10.3–14.5)
RBC CASTS # UR COMP ASSIST: 1 /HPF — SIGNIFICANT CHANGE UP (ref 0–4)
SARS-COV-2 RNA SPEC QL NAA+PROBE: SIGNIFICANT CHANGE UP
SODIUM SERPL-SCNC: 141 MMOL/L — SIGNIFICANT CHANGE UP (ref 135–145)
SP GR SPEC: 1.01 — SIGNIFICANT CHANGE UP (ref 1–1.03)
TSH SERPL-MCNC: 1.73 UIU/ML — SIGNIFICANT CHANGE UP (ref 0.36–3.74)
UROBILINOGEN FLD QL: 1 MG/DL — SIGNIFICANT CHANGE UP (ref 0.2–1)
VIT B12 SERPL-MCNC: 483 PG/ML — SIGNIFICANT CHANGE UP (ref 232–1245)
WBC # BLD: 7.96 K/UL — SIGNIFICANT CHANGE UP (ref 3.8–10.5)
WBC # FLD AUTO: 7.96 K/UL — SIGNIFICANT CHANGE UP (ref 3.8–10.5)
WBC UR QL: 0 /HPF — SIGNIFICANT CHANGE UP (ref 0–5)

## 2024-04-16 PROCEDURE — 70450 CT HEAD/BRAIN W/O DYE: CPT | Mod: 26

## 2024-04-16 PROCEDURE — 93306 TTE W/DOPPLER COMPLETE: CPT | Mod: 26

## 2024-04-16 PROCEDURE — 99223 1ST HOSP IP/OBS HIGH 75: CPT | Mod: GC

## 2024-04-16 PROCEDURE — 72125 CT NECK SPINE W/O DYE: CPT | Mod: 26

## 2024-04-16 PROCEDURE — 99222 1ST HOSP IP/OBS MODERATE 55: CPT

## 2024-04-16 RX ORDER — LOSARTAN POTASSIUM 100 MG/1
100 TABLET, FILM COATED ORAL DAILY
Refills: 0 | Status: DISCONTINUED | OUTPATIENT
Start: 2024-04-16 | End: 2024-04-18

## 2024-04-16 RX ORDER — ACETAMINOPHEN 500 MG
650 TABLET ORAL EVERY 6 HOURS
Refills: 0 | Status: DISCONTINUED | OUTPATIENT
Start: 2024-04-16 | End: 2024-04-22

## 2024-04-16 RX ORDER — METOPROLOL TARTRATE 50 MG
25 TABLET ORAL DAILY
Refills: 0 | Status: DISCONTINUED | OUTPATIENT
Start: 2024-04-16 | End: 2024-04-16

## 2024-04-16 RX ORDER — ATORVASTATIN CALCIUM 80 MG/1
20 TABLET, FILM COATED ORAL AT BEDTIME
Refills: 0 | Status: DISCONTINUED | OUTPATIENT
Start: 2024-04-16 | End: 2024-04-22

## 2024-04-16 RX ORDER — QUETIAPINE FUMARATE 200 MG/1
25 TABLET, FILM COATED ORAL ONCE
Refills: 0 | Status: COMPLETED | OUTPATIENT
Start: 2024-04-16 | End: 2024-04-16

## 2024-04-16 RX ORDER — SODIUM CHLORIDE 9 MG/ML
1000 INJECTION, SOLUTION INTRAVENOUS
Refills: 0 | Status: DISCONTINUED | OUTPATIENT
Start: 2024-04-16 | End: 2024-04-16

## 2024-04-16 RX ORDER — MEMANTINE HYDROCHLORIDE 10 MG/1
5 TABLET ORAL
Refills: 0 | Status: DISCONTINUED | OUTPATIENT
Start: 2024-04-16 | End: 2024-04-22

## 2024-04-16 RX ORDER — LEVOTHYROXINE SODIUM 125 MCG
1 TABLET ORAL
Qty: 0 | Refills: 0 | DISCHARGE

## 2024-04-16 RX ORDER — ACETAMINOPHEN 500 MG
975 TABLET ORAL EVERY 6 HOURS
Refills: 0 | Status: DISCONTINUED | OUTPATIENT
Start: 2024-04-16 | End: 2024-04-22

## 2024-04-16 RX ORDER — ONDANSETRON 8 MG/1
4 TABLET, FILM COATED ORAL EVERY 8 HOURS
Refills: 0 | Status: DISCONTINUED | OUTPATIENT
Start: 2024-04-16 | End: 2024-04-22

## 2024-04-16 RX ORDER — QUETIAPINE FUMARATE 200 MG/1
25 TABLET, FILM COATED ORAL
Refills: 0 | Status: DISCONTINUED | OUTPATIENT
Start: 2024-04-16 | End: 2024-04-22

## 2024-04-16 RX ORDER — ASPIRIN/CALCIUM CARB/MAGNESIUM 324 MG
1 TABLET ORAL
Qty: 0 | Refills: 0 | DISCHARGE

## 2024-04-16 RX ORDER — MAGNESIUM SULFATE 500 MG/ML
2 VIAL (ML) INJECTION ONCE
Refills: 0 | Status: COMPLETED | OUTPATIENT
Start: 2024-04-16 | End: 2024-04-16

## 2024-04-16 RX ORDER — AMLODIPINE BESYLATE 2.5 MG/1
5 TABLET ORAL DAILY
Refills: 0 | Status: DISCONTINUED | OUTPATIENT
Start: 2024-04-16 | End: 2024-04-16

## 2024-04-16 RX ORDER — DONEPEZIL HYDROCHLORIDE 10 MG/1
1 TABLET, FILM COATED ORAL
Refills: 0 | DISCHARGE

## 2024-04-16 RX ORDER — ACETAMINOPHEN 500 MG
100 TABLET ORAL EVERY 6 HOURS
Refills: 0 | Status: DISCONTINUED | OUTPATIENT
Start: 2024-04-16 | End: 2024-04-16

## 2024-04-16 RX ORDER — MAGNESIUM OXIDE 400 MG ORAL TABLET 241.3 MG
400 TABLET ORAL ONCE
Refills: 0 | Status: COMPLETED | OUTPATIENT
Start: 2024-04-16 | End: 2024-04-16

## 2024-04-16 RX ORDER — PANTOPRAZOLE SODIUM 20 MG/1
40 TABLET, DELAYED RELEASE ORAL
Refills: 0 | Status: DISCONTINUED | OUTPATIENT
Start: 2024-04-16 | End: 2024-04-22

## 2024-04-16 RX ORDER — CITALOPRAM 10 MG/1
1 TABLET, FILM COATED ORAL
Refills: 0 | DISCHARGE

## 2024-04-16 RX ORDER — BACITRACIN ZINC 500 UNIT/G
1 OINTMENT IN PACKET (EA) TOPICAL
Refills: 0 | Status: DISCONTINUED | OUTPATIENT
Start: 2024-04-16 | End: 2024-04-22

## 2024-04-16 RX ORDER — MEMANTINE HYDROCHLORIDE 10 MG/1
1 TABLET ORAL
Refills: 0 | DISCHARGE

## 2024-04-16 RX ORDER — DONEPEZIL HYDROCHLORIDE 10 MG/1
5 TABLET, FILM COATED ORAL AT BEDTIME
Refills: 0 | Status: DISCONTINUED | OUTPATIENT
Start: 2024-04-16 | End: 2024-04-22

## 2024-04-16 RX ORDER — CITALOPRAM 10 MG/1
10 TABLET, FILM COATED ORAL DAILY
Refills: 0 | Status: DISCONTINUED | OUTPATIENT
Start: 2024-04-16 | End: 2024-04-22

## 2024-04-16 RX ORDER — MORPHINE SULFATE 50 MG/1
2 CAPSULE, EXTENDED RELEASE ORAL ONCE
Refills: 0 | Status: DISCONTINUED | OUTPATIENT
Start: 2024-04-16 | End: 2024-04-16

## 2024-04-16 RX ORDER — FOLIC ACID 0.8 MG
1 TABLET ORAL
Refills: 0 | DISCHARGE

## 2024-04-16 RX ORDER — ATORVASTATIN CALCIUM 80 MG/1
1 TABLET, FILM COATED ORAL
Qty: 0 | Refills: 0 | DISCHARGE

## 2024-04-16 RX ORDER — LANOLIN ALCOHOL/MO/W.PET/CERES
3 CREAM (GRAM) TOPICAL AT BEDTIME
Refills: 0 | Status: DISCONTINUED | OUTPATIENT
Start: 2024-04-16 | End: 2024-04-22

## 2024-04-16 RX ORDER — LEVOTHYROXINE SODIUM 125 MCG
25 TABLET ORAL DAILY
Refills: 0 | Status: DISCONTINUED | OUTPATIENT
Start: 2024-04-16 | End: 2024-04-22

## 2024-04-16 RX ORDER — QUETIAPINE FUMARATE 200 MG/1
1 TABLET, FILM COATED ORAL
Refills: 0 | DISCHARGE

## 2024-04-16 RX ORDER — ENOXAPARIN SODIUM 100 MG/ML
40 INJECTION SUBCUTANEOUS EVERY 24 HOURS
Refills: 0 | Status: DISCONTINUED | OUTPATIENT
Start: 2024-04-16 | End: 2024-04-22

## 2024-04-16 RX ORDER — FOLIC ACID 0.8 MG
1 TABLET ORAL DAILY
Refills: 0 | Status: DISCONTINUED | OUTPATIENT
Start: 2024-04-16 | End: 2024-04-22

## 2024-04-16 RX ORDER — GABAPENTIN 400 MG/1
0 CAPSULE ORAL
Qty: 0 | Refills: 0 | DISCHARGE

## 2024-04-16 RX ORDER — METOPROLOL TARTRATE 50 MG
25 TABLET ORAL DAILY
Refills: 0 | Status: DISCONTINUED | OUTPATIENT
Start: 2024-04-16 | End: 2024-04-18

## 2024-04-16 RX ORDER — LIDOCAINE 4 G/100G
1 CREAM TOPICAL EVERY 24 HOURS
Refills: 0 | Status: DISCONTINUED | OUTPATIENT
Start: 2024-04-16 | End: 2024-04-22

## 2024-04-16 RX ORDER — AMLODIPINE BESYLATE 2.5 MG/1
5 TABLET ORAL DAILY
Refills: 0 | Status: DISCONTINUED | OUTPATIENT
Start: 2024-04-16 | End: 2024-04-18

## 2024-04-16 RX ORDER — PANTOPRAZOLE SODIUM 20 MG/1
1 TABLET, DELAYED RELEASE ORAL
Qty: 0 | Refills: 0 | DISCHARGE

## 2024-04-16 RX ADMIN — MAGNESIUM OXIDE 400 MG ORAL TABLET 400 MILLIGRAM(S): 241.3 TABLET ORAL at 19:58

## 2024-04-16 RX ADMIN — CITALOPRAM 10 MILLIGRAM(S): 10 TABLET, FILM COATED ORAL at 11:28

## 2024-04-16 RX ADMIN — ATORVASTATIN CALCIUM 20 MILLIGRAM(S): 80 TABLET, FILM COATED ORAL at 21:19

## 2024-04-16 RX ADMIN — QUETIAPINE FUMARATE 25 MILLIGRAM(S): 200 TABLET, FILM COATED ORAL at 06:20

## 2024-04-16 RX ADMIN — Medication 1 MILLIGRAM(S): at 11:28

## 2024-04-16 RX ADMIN — SODIUM CHLORIDE 75 MILLILITER(S): 9 INJECTION, SOLUTION INTRAVENOUS at 03:19

## 2024-04-16 RX ADMIN — Medication 25 GRAM(S): at 08:09

## 2024-04-16 RX ADMIN — Medication 975 MILLIGRAM(S): at 11:27

## 2024-04-16 RX ADMIN — Medication 1 APPLICATION(S): at 20:01

## 2024-04-16 RX ADMIN — Medication 1 APPLICATION(S): at 06:05

## 2024-04-16 RX ADMIN — Medication 975 MILLIGRAM(S): at 12:00

## 2024-04-16 RX ADMIN — MEMANTINE HYDROCHLORIDE 5 MILLIGRAM(S): 10 TABLET ORAL at 20:00

## 2024-04-16 RX ADMIN — MEMANTINE HYDROCHLORIDE 5 MILLIGRAM(S): 10 TABLET ORAL at 06:06

## 2024-04-16 RX ADMIN — MORPHINE SULFATE 2 MILLIGRAM(S): 50 CAPSULE, EXTENDED RELEASE ORAL at 01:44

## 2024-04-16 RX ADMIN — LIDOCAINE 1 PATCH: 4 CREAM TOPICAL at 06:03

## 2024-04-16 RX ADMIN — AMLODIPINE BESYLATE 5 MILLIGRAM(S): 2.5 TABLET ORAL at 06:05

## 2024-04-16 RX ADMIN — Medication 25 MICROGRAM(S): at 06:06

## 2024-04-16 RX ADMIN — Medication 650 MILLIGRAM(S): at 22:22

## 2024-04-16 RX ADMIN — Medication 650 MILLIGRAM(S): at 21:22

## 2024-04-16 RX ADMIN — QUETIAPINE FUMARATE 25 MILLIGRAM(S): 200 TABLET, FILM COATED ORAL at 00:55

## 2024-04-16 RX ADMIN — PANTOPRAZOLE SODIUM 40 MILLIGRAM(S): 20 TABLET, DELAYED RELEASE ORAL at 06:20

## 2024-04-16 RX ADMIN — MORPHINE SULFATE 2 MILLIGRAM(S): 50 CAPSULE, EXTENDED RELEASE ORAL at 01:14

## 2024-04-16 RX ADMIN — Medication 25 MILLIGRAM(S): at 06:06

## 2024-04-16 RX ADMIN — DONEPEZIL HYDROCHLORIDE 5 MILLIGRAM(S): 10 TABLET, FILM COATED ORAL at 21:19

## 2024-04-16 RX ADMIN — QUETIAPINE FUMARATE 25 MILLIGRAM(S): 200 TABLET, FILM COATED ORAL at 20:00

## 2024-04-16 NOTE — H&P ADULT - ASSESSMENT
89 y/o F with PMHx dementia with behavioural disturbance, CAD, HLD, HTN, hypothyroidism, PVC who is here s/p fall    # Unwitnessed fall    - EKG with T wave inversion V1, V2  - CT pelvis: Bilateral hip osteoarthritis. No fracture.   - Chest X ray: no acute pathology, pending offical read   - left femur  x ray: no fracture    - Left knee with signs of OA, pending official read   - WBC 11.66   - Neutrophil 8.96   - Check rvp   - Check UA    - check CT head, cervical spine for laceration found, likely to have head strike   - lidocaine patch, acetaminophen for pain   - TTE   - check orthostatic   - LR 75cc/hr   - check Mg, phos  - check B12, folate    - check CPK   - check TSH   - Fall precaution    - PT, OT consult  - pending CT pelvis official read if any spine etiology, consider order CT spine for chronic b/l leg numbness      # Mesenteric panniculitis   - per CT    - outpatient  f/u    # Irregular lobular contour of the uterus  - per CT  - outpatient f/u     # MAYA    - Cr 1.42 ( baseline1.16 )    - BUN 29   - EGFR 36  - LR 75 cc/hr  - monitor kidney function   -  hold ARB, gabapentin     # MHx CAD  # MHx HLD  # MHx HTN   - c/w amlodipine  - c/w statin   - c/w metoprolol     # MHx dementia with behavioural disturbance   - c/w quetiapine  - c/w donepezil  - c/w memantin    #MHx hypothyroidism  - c/w synthroid  - check TSH     #MHx GERD  - c/w PPI    # DVT ppx    - SCD for now, pending CT head     89 y/o F with PMHx dementia with behavioural disturbance, CAD, HLD, HTN, hypothyroidism, PVC who is here s/p fall    # Unwitnessed fall    - EKG with T wave inversion V1, V2  - CT pelvis: Bilateral hip osteoarthritis. No fracture.   - Chest X ray: no acute pathology, pending offical read   - left femur  x ray: no fracture    - Left knee with signs of OA, pending official read   - WBC 11.66, Neutrophil 8.96   - Check rvp   - Check UA    - check CT head, cervical spine for laceration found, likely to have head strike   - TTE   - check orthostatic   - LR 75cc/hr   - check Mg, phos  - check B12, folate    - check CPK   - check TSH   - Fall precaution    - PT, OT consult  - pending CT pelvis official read if any spine etiology, consider order CT spine for chronic b/l leg numbness   - bacitracin ordered for scalp laceration  - lidocaine patch, acetaminophen for pain   - bedrest until PT eval      # Mesenteric panniculitis   - per CT    - outpatient  f/u    # Irregular lobular contour of the uterus  - per CT  - outpatient f/u     # MAYA    - Cr 1.42 ( baseline1.16 )    - BUN 29   - EGFR 36  - LR 75 cc/hr  - monitor kidney function   -  hold ARB, gabapentin     # MHx CAD  # MHx HLD  # MHx HTN   - c/w amlodipine  - c/w statin   - c/w metoprolol     # MHx dementia with behavioural disturbance   - c/w quetiapine  - c/w donepezil  - c/w memantin    #MHx hypothyroidism  - c/w synthroid  - check TSH     #MHx GERD  - c/w PPI    # DVT ppx    - SCD for now, pending CT head

## 2024-04-16 NOTE — PHYSICAL THERAPY INITIAL EVALUATION ADULT - GAIT TRAINING, PT EVAL
Stable labs.  Keep eye appointments   in 1-3 treatments patient will assess ambulation and set goals

## 2024-04-16 NOTE — ED ADULT NURSE REASSESSMENT NOTE - NS ED NURSE REASSESS COMMENT FT1
Patient cleaned, turned, positioned for comfort and placed on primafit for urinary collection at this time. Side rails up, call light in reach, safety maintained, comfort measures provided.

## 2024-04-16 NOTE — CONSULT NOTE ADULT - SUBJECTIVE AND OBJECTIVE BOX
LEEROY GIL  [id number]      HPI:  89 y/o F with PMHx dementia with behavioural disturbance, CAD, HLD, HTN, hypothyroidism, PVC who is here s/p fall    Pt alert, oriented x2, per daughter, baseline. Pt reports tripping while walking stairs, unsure of headstrike, unsure of how she landed. Endorses left leg pain. Says feels dizzy recently.   Denies chest pain, palpitation, fever, chills, upper respiratory symptoms, GI symptoms, urinary symptoms.     Per daughter who is the HCP, unwitnessed fall, pt was sitting on stair when she went back home. No changes in mental status after fall. Reports havnt had any fall incident recently. Unaware of any recent discomfort that pt has. Says pt has been having b/l leg numbness, went to see PCP, says could be spine arthritis.     FULL code.         ALLERGIES:  penicillin (Unknown)      PAST MEDICAL & SURGICAL HISTORY:  Anemia      Alzheimers disease      CAD (coronary artery disease)      Hypothyroidism      HLD (hyperlipidemia)      Benign essential HTN      PVC (premature ventricular contraction)      History of left knee surgery            CURRENT MEDICATIONS:  MEDICATIONS  (STANDING):  amLODIPine   Tablet 5 milliGRAM(s) Oral daily  atorvastatin 20 milliGRAM(s) Oral at bedtime  bacitracin   Ointment 1 Application(s) Topical two times a day  citalopram 10 milliGRAM(s) Oral daily  donepezil 5 milliGRAM(s) Oral at bedtime  enoxaparin Injectable 40 milliGRAM(s) SubCutaneous every 24 hours  folic acid 1 milliGRAM(s) Oral daily  levothyroxine 25 MICROGram(s) Oral daily  lidocaine   4% Patch 1 Patch Transdermal every 24 hours  losartan 100 milliGRAM(s) Oral daily  magnesium oxide 400 milliGRAM(s) Oral once  memantine 5 milliGRAM(s) Oral two times a day  metoprolol succinate ER 25 milliGRAM(s) Oral daily  pantoprazole    Tablet 40 milliGRAM(s) Oral before breakfast  QUEtiapine 25 milliGRAM(s) Oral two times a day    MEDICATIONS  (PRN):  acetaminophen     Tablet .. 975 milliGRAM(s) Oral every 6 hours PRN Moderate Pain (4 - 6)  acetaminophen     Tablet .. 650 milliGRAM(s) Oral every 6 hours PRN Temp greater or equal to 38C (100.4F), Mild Pain (1 - 3)  aluminum hydroxide/magnesium hydroxide/simethicone Suspension 30 milliLiter(s) Oral every 4 hours PRN Dyspepsia  melatonin 3 milliGRAM(s) Oral at bedtime PRN Insomnia  ondansetron Injectable 4 milliGRAM(s) IV Push every 8 hours PRN Nausea and/or Vomiting      SOCIAL HISTORY:  denies    FAMILY HISTORY:  denies    ROS:  All 10 systems reviewed and positives noted in HPI    OBJECTIVE:    VITAL SIGNS:  Vital Signs Last 24 Hrs  T(C): 36.4 (16 Apr 2024 05:04), Max: 36.7 (16 Apr 2024 03:16)  T(F): 97.5 (16 Apr 2024 05:04), Max: 98.1 (16 Apr 2024 03:16)  HR: 72 (16 Apr 2024 05:04) (68 - 78)  BP: 154/79 (16 Apr 2024 05:04) (144/61 - 169/77)  BP(mean): --  RR: 16 (16 Apr 2024 05:04) (16 - 20)  SpO2: 96% (16 Apr 2024 05:04) (96% - 98%)    Parameters below as of 16 Apr 2024 05:04  Patient On (Oxygen Delivery Method): room air        PHYSICAL EXAM:  General:  no distress  HEENT: sclera anicteric  Neck: supple, no carotid bruits b/l  CVS: JVP ~ 7 cm H20, RRR, s1, s2, +murmur  Chest: unlabored respirations, clear to auscultation b/l  Abdomen: non-distended  Extremities: no lower extremity edema b/l  Neuro: awake, alert & oriented x 2 person and place  Psych: normal affect      LABS:                        12.2   7.96  )-----------( 201      ( 16 Apr 2024 05:42 )             35.9     04-16    141  |  104  |  22  ----------------------------<  101<H>  3.9   |  32<H>  |  1.23    Ca    8.7      16 Apr 2024 05:42  Phos  3.2     04-16  Mg     1.5     04-16    TPro  6.6  /  Alb  3.1<L>  /  TBili  0.8  /  DBili  x   /  AST  15  /  ALT  14  /  AlkPhos  98  04-16    CARDIAC MARKERS ( 16 Apr 2024 00:01 )  x     / x     / 127 U/L / x     / x          PT/INR - ( 15 Apr 2024 21:30 )   PT: 12.2 sec;   INR: 1.04 ratio         PTT - ( 15 Apr 2024 21:30 )  PTT:31.2 sec      ECG: Sinus rhythm 1st degree av block      TTE: < from: TTE Echo Complete w/o Contrast w/ Doppler (04.16.24 @ 08:53) >   1. Normal global left ventricular systolic function.   2. Left ventricular ejection fraction, by visual estimation, is 55 to 60%.   3. Mildly increased LV wall thickness.   4. Normal left ventricular internal cavity size.   5. Normal right ventricular size and function.   6. The left atrium is normal in size.   7. The right atrium is normal in size.   8. Mild mitral annular calcification.   9. Moderate thickening and calcification of the anterior and posterior   mitral valve leaflets.  10. Mild mitral valve regurgitation.  11. Mild tricuspid regurgitation.  12. Aortic valve leafletcalcification with restricted leaflet opening.   Severe aortic valve stenosis (peak velocity 2.9 m/s, mean gradient 21   mmHg, calculated KIKI by continuity equation 0.8 cm^2, DI 0.24).  13. There is no evidence of pericardial effusion.         LEEROY GIL  [id number]      HPI:  89 y/o F with PMHx dementia with behavioural disturbance, CAD, HLD, HTN, hypothyroidism, PVC who is here s/p fall.    Patient alert, oriented x2, per daughter, baseline. Patient reports tripping while walking stairs, unsure of headstrike, unsure of how she landed. Endorses left leg pain. Says feels dizzy recently.   Denies chest pain, palpitation, fever, chills, upper respiratory symptoms, GI symptoms, urinary symptoms.     Per daughter who is the HCP, unwitnessed fall, patientt was sitting on stair when she went back home. No changes in mental status after fall. Reports haven't had any fall incident recently. Unaware of any recent discomfort that pt has. Says pt has been having b/l leg numbness, went to see PCP, says could be spine arthritis.           ALLERGIES:  penicillin (Unknown)      PAST MEDICAL & SURGICAL HISTORY:  Anemia  Alzheimers disease  CAD (coronary artery disease)  Hypothyroidism  HLD (hyperlipidemia)  Benign essential HTN  PVC (premature ventricular contraction)  History of left knee surgery      CURRENT MEDICATIONS:  MEDICATIONS  (STANDING):  amLODIPine   Tablet 5 milliGRAM(s) Oral daily  atorvastatin 20 milliGRAM(s) Oral at bedtime  bacitracin   Ointment 1 Application(s) Topical two times a day  citalopram 10 milliGRAM(s) Oral daily  donepezil 5 milliGRAM(s) Oral at bedtime  enoxaparin Injectable 40 milliGRAM(s) SubCutaneous every 24 hours  folic acid 1 milliGRAM(s) Oral daily  levothyroxine 25 MICROGram(s) Oral daily  lidocaine   4% Patch 1 Patch Transdermal every 24 hours  losartan 100 milliGRAM(s) Oral daily  magnesium oxide 400 milliGRAM(s) Oral once  memantine 5 milliGRAM(s) Oral two times a day  metoprolol succinate ER 25 milliGRAM(s) Oral daily  pantoprazole    Tablet 40 milliGRAM(s) Oral before breakfast  QUEtiapine 25 milliGRAM(s) Oral two times a day      ROS:  All 10 systems reviewed and positives noted in HPI    OBJECTIVE:    VITAL SIGNS:  Vital Signs Last 24 Hrs  T(C): 36.4 (16 Apr 2024 05:04), Max: 36.7 (16 Apr 2024 03:16)  T(F): 97.5 (16 Apr 2024 05:04), Max: 98.1 (16 Apr 2024 03:16)  HR: 72 (16 Apr 2024 05:04) (68 - 78)  BP: 154/79 (16 Apr 2024 05:04) (144/61 - 169/77)  BP(mean): --  RR: 16 (16 Apr 2024 05:04) (16 - 20)  SpO2: 96% (16 Apr 2024 05:04) (96% - 98%)    Parameters below as of 16 Apr 2024 05:04  Patient On (Oxygen Delivery Method): room air      PHYSICAL EXAM:  General:  no distress  HEENT: sclera anicteric  Neck: supple, no carotid bruits b/l  CVS: JVP ~ 7 cm H20, RRR, s1, s2, +murmur  Chest: unlabored respirations, clear to auscultation b/l  Abdomen: non-distended  Extremities: no lower extremity edema b/l  Neuro: awake, alert & oriented x 2 person and place  Psych: normal affect      LABS:                        12.2   7.96  )-----------( 201      ( 16 Apr 2024 05:42 )             35.9     04-16    141  |  104  |  22  ----------------------------<  101<H>  3.9   |  32<H>  |  1.23    Ca    8.7      16 Apr 2024 05:42  Phos  3.2     04-16  Mg     1.5     04-16    TPro  6.6  /  Alb  3.1<L>  /  TBili  0.8  /  DBili  x   /  AST  15  /  ALT  14  /  AlkPhos  98  04-16    CARDIAC MARKERS ( 16 Apr 2024 00:01 )  x     / x     / 127 U/L / x     / x          PT/INR - ( 15 Apr 2024 21:30 )   PT: 12.2 sec;   INR: 1.04 ratio         PTT - ( 15 Apr 2024 21:30 )  PTT:31.2 sec      ECG: Sinus rhythm 1st degree av block      TTE: < from: TTE Echo Complete w/o Contrast w/ Doppler (04.16.24 @ 08:53) >   1. Normal global left ventricular systolic function.   2. Left ventricular ejection fraction, by visual estimation, is 55 to 60%.   3. Mildly increased LV wall thickness.   4. Normal left ventricular internal cavity size.   5. Normal right ventricular size and function.   6. The left atrium is normal in size.   7. The right atrium is normal in size.   8. Mild mitral annular calcification.   9. Moderate thickening and calcification of the anterior and posterior mitral valve leaflets.  10. Mild mitral valve regurgitation.  11. Mild tricuspid regurgitation.  12. Aortic valve leafletcalcification with restricted leaflet opening.   Severe aortic valve stenosis (peak velocity 2.9 m/s, mean gradient 21   mmHg, calculated KIKI by continuity equation 0.8 cm^2, DI 0.24).  13. There is no evidence of pericardial effusion.         LEEROY GIL  [id number]      HPI:  89 y/o F with PMHx dementia with behavioural disturbance, CAD, HLD, HTN, hypothyroidism who is here s/p fall.    Patient alert, oriented x2, per daughter, baseline. Patient reports tripping while walking stairs, unsure of headstrike, unsure of how she landed. Endorses left leg pain. Denies chest pain, palpitation, fever, chills, upper respiratory symptoms, GI symptoms, urinary symptoms.     Per daughter who is the HCP, unwitnessed fall, patient was sitting on stair when she went back home. No changes in mental status after fall. Reports haven't had any fall incident recently. Unaware of any recent discomfort that patient has. Says patient has been having b/l leg numbness, went to see PCP, says could be spine arthritis.         ALLERGIES:  penicillin (Unknown)      PAST MEDICAL & SURGICAL HISTORY:  Anemia  Alzheimers disease  CAD (coronary artery disease)  Hypothyroidism  HLD (hyperlipidemia)  Benign essential HTN  PVC (premature ventricular contraction)  History of left knee surgery      CURRENT MEDICATIONS:  MEDICATIONS  (STANDING):  amLODIPine   Tablet 5 milliGRAM(s) Oral daily  atorvastatin 20 milliGRAM(s) Oral at bedtime  bacitracin   Ointment 1 Application(s) Topical two times a day  citalopram 10 milliGRAM(s) Oral daily  donepezil 5 milliGRAM(s) Oral at bedtime  enoxaparin Injectable 40 milliGRAM(s) SubCutaneous every 24 hours  folic acid 1 milliGRAM(s) Oral daily  levothyroxine 25 MICROGram(s) Oral daily  lidocaine   4% Patch 1 Patch Transdermal every 24 hours  losartan 100 milliGRAM(s) Oral daily  magnesium oxide 400 milliGRAM(s) Oral once  memantine 5 milliGRAM(s) Oral two times a day  metoprolol succinate ER 25 milliGRAM(s) Oral daily  pantoprazole    Tablet 40 milliGRAM(s) Oral before breakfast  QUEtiapine 25 milliGRAM(s) Oral two times a day      ROS:  All 10 systems reviewed and positives noted in HPI    OBJECTIVE:    VITAL SIGNS:  Vital Signs Last 24 Hrs  T(C): 36.4 (16 Apr 2024 05:04), Max: 36.7 (16 Apr 2024 03:16)  T(F): 97.5 (16 Apr 2024 05:04), Max: 98.1 (16 Apr 2024 03:16)  HR: 72 (16 Apr 2024 05:04) (68 - 78)  BP: 154/79 (16 Apr 2024 05:04) (144/61 - 169/77)  BP(mean): --  RR: 16 (16 Apr 2024 05:04) (16 - 20)  SpO2: 96% (16 Apr 2024 05:04) (96% - 98%)    Parameters below as of 16 Apr 2024 05:04  Patient On (Oxygen Delivery Method): room air      PHYSICAL EXAM:  General:  no distress  HEENT: sclera anicteric  Neck: supple, no carotid bruits b/l  CVS: JVP ~ 7 cm H20, RRR, s1, s2, +murmur  Chest: unlabored respirations, clear to auscultation b/l  Abdomen: non-distended  Extremities: no lower extremity edema b/l  Neuro: awake, alert & oriented x 2 person and place  Psych: normal affect      LABS:                        12.2   7.96  )-----------( 201      ( 16 Apr 2024 05:42 )             35.9     04-16    141  |  104  |  22  ----------------------------<  101<H>  3.9   |  32<H>  |  1.23    Ca    8.7      16 Apr 2024 05:42  Phos  3.2     04-16  Mg     1.5     04-16    TPro  6.6  /  Alb  3.1<L>  /  TBili  0.8  /  DBili  x   /  AST  15  /  ALT  14  /  AlkPhos  98  04-16    CARDIAC MARKERS ( 16 Apr 2024 00:01 )  x     / x     / 127 U/L / x     / x          PT/INR - ( 15 Apr 2024 21:30 )   PT: 12.2 sec;   INR: 1.04 ratio         PTT - ( 15 Apr 2024 21:30 )  PTT:31.2 sec      ECG: Sinus rhythm 1st degree av block      TTE: < from: TTE Echo Complete w/o Contrast w/ Doppler (04.16.24 @ 08:53) >   1. Normal global left ventricular systolic function.   2. Left ventricular ejection fraction, by visual estimation, is 55 to 60%.   3. Mildly increased LV wall thickness.   4. Normal left ventricular internal cavity size.   5. Normal right ventricular size and function.   6. The left atrium is normal in size.   7. The right atrium is normal in size.   8. Mild mitral annular calcification.   9. Moderate thickening and calcification of the anterior and posterior mitral valve leaflets.  10. Mild mitral valve regurgitation.  11. Mild tricuspid regurgitation.  12. Aortic valve leafletcalcification with restricted leaflet opening.   Severe aortic valve stenosis (peak velocity 2.9 m/s, mean gradient 21   mmHg, calculated KIKI by continuity equation 0.8 cm^2, DI 0.24).  13. There is no evidence of pericardial effusion.         LEEROY GIL  [id number]      HPI:  89 y/o F with PMHx dementia with behavioural disturbance, CAD, HLD, HTN, hypothyroidism who is here s/p fall.    Patient alert, oriented x2, per daughter, baseline. Patient reports tripping while walking stairs, unsure of headstrike, unsure of how she landed. Endorses left leg pain. Denies chest pain, palpitation, fever, chills, upper respiratory symptoms, GI symptoms, urinary symptoms.     Per daughter who is the HCP, unwitnessed fall, patient was sitting on stair when she went back home. No changes in mental status after fall. Reports haven't had any fall incident recently. Unaware of any recent discomfort that patient has. Says patient has been having b/l leg numbness, went to see PCP, says could be spine arthritis.         ALLERGIES:  penicillin (Unknown)      PAST MEDICAL & SURGICAL HISTORY:  Anemia  Alzheimers disease  CAD (coronary artery disease)  Hypothyroidism  HLD (hyperlipidemia)  Benign essential HTN  PVC (premature ventricular contraction)  History of left knee surgery      CURRENT MEDICATIONS:  MEDICATIONS  (STANDING):  amLODIPine   Tablet 5 milliGRAM(s) Oral daily  atorvastatin 20 milliGRAM(s) Oral at bedtime  bacitracin   Ointment 1 Application(s) Topical two times a day  citalopram 10 milliGRAM(s) Oral daily  donepezil 5 milliGRAM(s) Oral at bedtime  enoxaparin Injectable 40 milliGRAM(s) SubCutaneous every 24 hours  folic acid 1 milliGRAM(s) Oral daily  levothyroxine 25 MICROGram(s) Oral daily  lidocaine   4% Patch 1 Patch Transdermal every 24 hours  losartan 100 milliGRAM(s) Oral daily  magnesium oxide 400 milliGRAM(s) Oral once  memantine 5 milliGRAM(s) Oral two times a day  metoprolol succinate ER 25 milliGRAM(s) Oral daily  pantoprazole    Tablet 40 milliGRAM(s) Oral before breakfast  QUEtiapine 25 milliGRAM(s) Oral two times a day      ROS:  All 10 systems reviewed and positives noted in HPI    OBJECTIVE:    VITAL SIGNS:  Vital Signs Last 24 Hrs  T(C): 36.4 (16 Apr 2024 05:04), Max: 36.7 (16 Apr 2024 03:16)  T(F): 97.5 (16 Apr 2024 05:04), Max: 98.1 (16 Apr 2024 03:16)  HR: 72 (16 Apr 2024 05:04) (68 - 78)  BP: 154/79 (16 Apr 2024 05:04) (144/61 - 169/77)  BP(mean): --  RR: 16 (16 Apr 2024 05:04) (16 - 20)  SpO2: 96% (16 Apr 2024 05:04) (96% - 98%)    Parameters below as of 16 Apr 2024 05:04  Patient On (Oxygen Delivery Method): room air      PHYSICAL EXAM:  General:  no distress  HEENT: sclera anicteric  Neck: supple, no carotid bruits b/l  CVS: JVP ~ 7 cm H20, RRR, s1, s2, +murmur  Chest: unlabored respirations, clear to auscultation b/l  Abdomen: non-distended  Extremities: no lower extremity edema b/l  Neuro: awake, alert & oriented x 2 person and place  Psych: normal affect      LABS:                        12.2   7.96  )-----------( 201      ( 16 Apr 2024 05:42 )             35.9     04-16    141  |  104  |  22  ----------------------------<  101<H>  3.9   |  32<H>  |  1.23    Ca    8.7      16 Apr 2024 05:42  Phos  3.2     04-16  Mg     1.5     04-16    TPro  6.6  /  Alb  3.1<L>  /  TBili  0.8  /  DBili  x   /  AST  15  /  ALT  14  /  AlkPhos  98  04-16    CARDIAC MARKERS ( 16 Apr 2024 00:01 )  x     / x     / 127 U/L / x     / x          PT/INR - ( 15 Apr 2024 21:30 )   PT: 12.2 sec;   INR: 1.04 ratio         PTT - ( 15 Apr 2024 21:30 )  PTT:31.2 sec      ECG: Sinus rhythm, nonspecific ST abnormalities      TTE: < from: TTE Echo Complete w/o Contrast w/ Doppler (04.16.24 @ 08:53) >   1. Normal global left ventricular systolic function.   2. Left ventricular ejection fraction, by visual estimation, is 55 to 60%.   3. Mildly increased LV wall thickness.   4. Normal left ventricular internal cavity size.   5. Normal right ventricular size and function.   6. The left atrium is normal in size.   7. The right atrium is normal in size.   8. Mild mitral annular calcification.   9. Moderate thickening and calcification of the anterior and posterior mitral valve leaflets.  10. Mild mitral valve regurgitation.  11. Mild tricuspid regurgitation.  12. Aortic valve leafletcalcification with restricted leaflet opening.   Severe aortic valve stenosis (peak velocity 2.9 m/s, mean gradient 21   mmHg, calculated KIKI by continuity equation 0.8 cm^2, DI 0.24).  13. There is no evidence of pericardial effusion.

## 2024-04-16 NOTE — CONSULT NOTE ADULT - ASSESSMENT
Assessment 88-year-old female history of dementia, CAD, HLD, hypertension, hypothyroidism admitted status post fall.  Patient does not recall the mechanism of fall.  Denies any chest pain, palpitations, dizziness or shortness of breath.  Cardiology consulted for severe AS    Recommendations  EKG sinus with first-degree block  TTE EF 55 to 60% and severe AS  Discussed the option of valve repair, patient would like to discuss with family. Patient currently asymptomatic, hemodynamically stable, may followup with cardiology/structural heart outpatient as option   Assessment 88-year-old female history of dementia, CAD, HLD, hypertension, hypothyroidism admitted status post fall.  Patient does not recall the mechanism of fall.  Denies any chest pain, palpitations, dizziness or shortness of breath.  Cardiology consulted for severe AS    Recommendations  EKG sinus with first-degree block  TTE EF 55 to 60% and severe AS  Discussed the option of TAVR, patient would like to discuss with family. Patient currently asymptomatic, hemodynamically stable, may followup with cardiology/structural heart outpatient as option   Assessment 88-year-old female history of dementia, CAD, HLD, hypertension, hypothyroidism admitted status post fall.  Patient does not recall the mechanism of fall.  Denies any chest pain, palpitations, dizziness or shortness of breath.  Cardiology consulted for severe AS    Recommendations  EKG sinus with nonspecific ST abnormalities  TTE EF 55 to 60% and severe AS  Discussed the option of TAVR, patient would like to discuss with family. Patient currently asymptomatic, hemodynamically stable, may followup with cardiology/structural heart outpatient as option

## 2024-04-16 NOTE — H&P ADULT - ATTENDING COMMENTS
#unwitnessed fall, per patient mechanical Fall   #MAYA  - NS 75cc/hr  - Imaging w/ no acute fracture  - PT consult   - CPK level, TSH, B12  - UA  - Morphine PRN  - Echocardiogram  - CT head non contrast  - CT C-Spine  - orthostatics    #Irregular uterus  - f/up outpatient     SCDs  regular diet  Full code

## 2024-04-16 NOTE — CONSULT NOTE ADULT - NS ATTEND AMEND GEN_ALL_CORE FT
I have seen and examined the patient. I have discussed case with RONALD Keslser.    Chelly Moss is an 88 year old woman with past medical history of Mild Dementia, Coronary artery disease, Hypertension and Hyperlipidemia who presented after mechanical fall, found to have severe aortic valve stenosis.    Cardiology consulted due to severe aortic valve stenosis. Echo consistent with normal LVEF 55-60%, mild LVH, severe aortic valve stenosis. CT head with no acute findings. CXR clear and xray imaging with no acute fractures. The patient denies angina or dyspnea, no clinical signs of congestive heart failure, however given recent fall cannot entirely rule out prior syncope. I have seen and examined the patient. I have discussed case with RONALD Kessler.    Chelly Moss is an 88 year old woman with past medical history of Mild Dementia, Coronary artery disease, Hypertension and Hyperlipidemia who presented after mechanical fall, found to have severe aortic valve stenosis.    Cardiology consulted due to severe aortic valve stenosis. Echo consistent with normal LVEF 55-60%, mild LVH, severe aortic valve stenosis. CT head with no acute findings. CXR clear and xray imaging with no acute fractures. The patient denies angina or dyspnea, no clinical signs of congestive heart failure, however given recent fall cannot entirely rule out prior syncope. Physical exam notable for harsh systolic ejection murmur radiating to carotids. Overall, patient has severe aortic stenosis and it is not entirely clear if she is symptomatic from this given her mild dementia, will need to further discuss with daughter/HCP if patient would ultimately be a candidate for TAVR in near future.     We will continue to follow along.    Diogo Calle MD  Cardiology I have seen and examined the patient. I have discussed case with RONALD Kessler.    Chelly Moss is an 88 year old woman with past medical history of Mild Dementia, Coronary artery disease, Hypertension and Hyperlipidemia who presented after mechanical fall, found to have severe aortic valve stenosis.    Cardiology consulted due to severe aortic valve stenosis. ECG consistent with sinus rhythm and nonspecific ST abnormalities. Echo consistent with normal LVEF 55-60%, mild LVH, severe aortic valve stenosis. CT head with no acute findings. CXR clear and xray imaging with no acute fractures. The patient denies angina or dyspnea, no clinical signs of congestive heart failure, however given recent fall cannot entirely rule out prior syncope. Physical exam notable for harsh systolic ejection murmur radiating to carotids. Overall, patient has severe aortic stenosis and it is not entirely clear if she is symptomatic from this given her mild dementia, will need to further discuss with daughter/HCP if patient would ultimately be a candidate for TAVR in near future.     We will continue to follow along.    Diogo Calle MD  Cardiology

## 2024-04-16 NOTE — PHYSICAL THERAPY INITIAL EVALUATION ADULT - PERTINENT HX OF CURRENT PROBLEM, REHAB EVAL
87 y/o F with PMHx dementia with behavioural disturbance, CAD, HLD, HTN, hypothyroidism, PVC who is here s/p fall  Pt alert, oriented x2, per daughter, baseline. Pt reports tripping while walking stairs, unsure of headstrike, unsure of how she landed. Endorses left leg pain. Says feels dizzy recently.   Denies chest pain, palpitation, fever, chills, upper respiratory symptoms, GI symptoms, urinary symptoms.   Per daughter who is the HCP, unwitnessed fall, pt was sitting on stair when she went back home. No changes in mental status after fall. Reports havnt had any fall incident recently. Unaware of any recent discomfort that pt has. Says pt has been having b/l leg numbness, went to see PCP, says could be spine arthritis.

## 2024-04-16 NOTE — H&P ADULT - HISTORY OF PRESENT ILLNESS
89 y/o F with PMHx dementia with behavioural disturbance, CAD, HLD, HTN, hypothyroidism, PVC who is here s/p fall    Pt alert, oriented x2, per daughter, baseline. Pt reports tripping while walking stairs, unsure of headstrike, unsure of how she landed. Endorses left leg pain. Says feels dizzy recently.   Denies chest pain, palpitation, fever, chills, upper respiratory symptoms, GI symptoms, urinary symptoms.     Per daughter who is the HCP, unwitnessed fall, pt was sitting on stair when she went back home. Reports havnt had any fall incident recently. Unaware of any recent discomfort that pt has. Says pt has been having b/l leg numbness, went to see PCP, says could be spine arthritis.     FULL code.  87 y/o F with PMHx dementia with behavioural disturbance, CAD, HLD, HTN, hypothyroidism, PVC who is here s/p fall    Pt alert, oriented x2, per daughter, baseline. Pt reports tripping while walking stairs, unsure of headstrike, unsure of how she landed. Endorses left leg pain. Says feels dizzy recently.   Denies chest pain, palpitation, fever, chills, upper respiratory symptoms, GI symptoms, urinary symptoms.     Per daughter who is the HCP, unwitnessed fall, pt was sitting on stair when she went back home. No changes in mental status after fall. Reports havnt had any fall incident recently. Unaware of any recent discomfort that pt has. Says pt has been having b/l leg numbness, went to see PCP, says could be spine arthritis.     FULL code.

## 2024-04-16 NOTE — GOALS OF CARE CONVERSATION - ADVANCED CARE PLANNING - CONVERSATION DETAILS
Pt. from home s/p fall. Pt. found to have UTI. Lives with her daughter Rosmery. Pt. has dementia. Oriented to self. Pleasant and cooperative but forgetful. PCP is Dr. Park. She also has hx. HTN and CAD.   I called her daughter Rosmery to discuss GOC.  She stated she would be in later, and would prefer to have this discussion in person with her mother.  I will follow up with her during her visit later. Pt. from home s/p fall. Pt. found to have UTI. Lives with her daughter Rosmery. Pt. has dementia. Oriented to self. Pleasant and cooperative but forgetful. PCP is Dr. Park. She also has hx. HTN and CAD.   I called her daughter Rosmery to discuss GOC.  She stated she would be in later, and would prefer to have this discussion in person with her mother.  I then met with her and her mother at bedside when Rosmery came in.  Pt. was able to state the hospital she is in, and stated it was 2022.  We discussed what CPR is, and answered questions about it to patient and daughter.  Pt. decided she would "want to try" attempted resuscitation in the event of cardiac arrest.  Daughter defers to her mothers wishes.  Pt. remains Full Code.

## 2024-04-16 NOTE — PROGRESS NOTE ADULT - SUBJECTIVE AND OBJECTIVE BOX
89 y/o F with PMHx dementia with behavioural disturbance, CAD, HLD, HTN, hypothyroidism, PVC who is here s/p fall admitted for further work up.    Patient seen and examined at bedside. Working with OT    REVIEW OF SYSTEMS:  CONSTITUTIONAL: (-) weakness, (-) fevers, (-) chills  EYES/ENT: (-) visual changes,  (-) vertigo,  (-) throat pain   NECK:  (-) pain, (-) stiffness  RESPIRATORY:  (-) shortness of breath, (-) cough,  (-) wheezing,  (-) hemoptysis   CARDIOVASCULAR:  (-) chest pain, (-) palpitations  GASTROINTESTINAL:  (-) abdominal or epigastric pain, (-) nausea, (-) vomiting, (-) diarrhea, (-) constipation, (-) melena,  (-) hematemesis,  (-) hematochezia  GENITOURINARY: (-) dysuria, (-) frequency, (-) hematuria  NEUROLOGICAL: (-) numbness, (-) weakness  SKIN: (-) itching, (-) rashes, (-) lesions    PHYSICAL EXAM:  Vital Signs Last 24 Hrs  T(C): 36.4 (2024 05:04), Max: 36.7 (2024 03:16)  T(F): 97.5 (2024 05:04), Max: 98.1 (2024 03:16)  HR: 72 (2024 05:04) (68 - 78)  BP: 154/79 (2024 05:04) (144/61 - 169/77)  RR: 16 (2024 05:04) (16 - 20)  SpO2: 96% (2024 05:04) (96% - 98%)    Parameters below as of 2024 05:04  Patient On (Oxygen Delivery Method): room air    PHYSICAL EXAM:  GENERAL: NAD, lying in bed comfortably  HEAD:  Atraumatic, Normocephalic  RESP: Clear to auscultation bilaterally, good air entry bilaterally; No wheezing, rales, or rhonchi. Unlabored respirations  CARDIAC: Regular rate and rhythm. S1 and S2. No murmurs, rubs, or gallops  GI:  Soft, Nontender, Nondistended. Bowel sounds present x4 quadrants; No hepatomegaly. No splenomegaly.  EXTREMITIES:  2+ Peripheral Pulses. Capillary refill <2 seconds. No clubbing, cyanosis, or edema  NERVOUS SYSTEM:  Alert & Oriented X3, speech clear. No deficits   MSK: FROM all 4 extremities, full and equal strength  SKIN: No rashes, bruises, or other lesions    MEDICATIONS  (STANDING):  amLODIPine   Tablet 5 milliGRAM(s) Oral daily  atorvastatin 20 milliGRAM(s) Oral at bedtime  bacitracin   Ointment 1 Application(s) Topical two times a day  citalopram 10 milliGRAM(s) Oral daily  donepezil 5 milliGRAM(s) Oral at bedtime  enoxaparin Injectable 40 milliGRAM(s) SubCutaneous every 24 hours  folic acid 1 milliGRAM(s) Oral daily  levothyroxine 25 MICROGram(s) Oral daily  lidocaine   4% Patch 1 Patch Transdermal every 24 hours  losartan 100 milliGRAM(s) Oral daily  memantine 5 milliGRAM(s) Oral two times a day  metoprolol succinate ER 25 milliGRAM(s) Oral daily  pantoprazole    Tablet 40 milliGRAM(s) Oral before breakfast  QUEtiapine 25 milliGRAM(s) Oral two times a day    MEDICATIONS  (PRN):  acetaminophen     Tablet .. 975 milliGRAM(s) Oral every 6 hours PRN Moderate Pain (4 - 6)  acetaminophen     Tablet .. 650 milliGRAM(s) Oral every 6 hours PRN Temp greater or equal to 38C (100.4F), Mild Pain (1 - 3)  aluminum hydroxide/magnesium hydroxide/simethicone Suspension 30 milliLiter(s) Oral every 4 hours PRN Dyspepsia  melatonin 3 milliGRAM(s) Oral at bedtime PRN Insomnia  ondansetron Injectable 4 milliGRAM(s) IV Push every 8 hours PRN Nausea and/or Vomiting      LABS                   12.2   7.96  )-----------( 201      ( 2024 05:42 )             35.9     2024 05:42    141    |  104    |  22     ----------------------------<  101    3.9     |  32     |  1.23     Ca    8.7        2024 05:42  Phos  3.2       2024 00:01  Mg     1.5       2024 00:01    TPro  6.6    /  Alb  3.1    /  TBili  0.8    /  DBili  x      /  AST  15     /  ALT  14     /  AlkPhos  98     2024 05:42    PT/INR - ( 15 Apr 2024 21:30 )   PT: 12.2 sec;   INR: 1.04 ratio         PTT - ( 15 Apr 2024 21:30 )  PTT:31.2 sec  CAPILLARY BLOOD GLUCOSE      LIVER FUNCTIONS - ( 2024 05:42 )  Alb: 3.1 g/dL / Pro: 6.6 g/dL / ALK PHOS: 98 U/L / ALT: 14 U/L / AST: 15 U/L / GGT: x           Urinalysis Basic - ( 2024 06:00 )    Color: Yellow / Appearance: Clear / S.014 / pH: x  Gluc: x / Ketone: Negative mg/dL  / Bili: Negative / Urobili: 1.0 mg/dL   Blood: x / Protein: Negative mg/dL / Nitrite: Negative   Leuk Esterase: Moderate / RBC: 1 /HPF / WBC 0 /HPF   Sq Epi: x / Non Sq Epi: x / Bacteria: Few /HPF        CT HEAD  IMPRESSION:    Stable appearance of the brain.    No acute intracranial abnormalities.    Small vessel and atrophic changes.    Moderately advanced degenerative changes throughout cervical spine    No vertebral fracture, collapse or subluxation.    --- End of Report ---    WALDO CRUZ MD; Attending Radiologist  This document has been electronically signed. 2024  2:50AM

## 2024-04-16 NOTE — PATIENT PROFILE ADULT - HAVE YOU EXPERIENCED VIOLENCE OR A TRAUMATIC EVENT?
Agency/Facility Name: [facility]   Spoke To: [facility]   Outcome: [facility] called to request Pt's Vitals, DON would want to review her vitals and possibly accept Pt today to facility.     [id number]  Agency/Facility Name: [facility]   Outcome: DPA left Vmail regarding Pt not medically cleared.     no

## 2024-04-16 NOTE — OCCUPATIONAL THERAPY INITIAL EVALUATION ADULT - PRECAUTIONS/LIMITATIONS, REHAB EVAL
Have we ever prescribed this med? Yes.  If yes, what date? 11/8/17    Last OV: 11/8/17- DBaker    Next OV: 5/2/18    DX: COPD    Medications: Spiriva Respimat 2.5MCG     fall precautions

## 2024-04-16 NOTE — PATIENT PROFILE ADULT - FALL HARM RISK - HARM RISK INTERVENTIONS
Assistance with ambulation/Assistance OOB with selected safe patient handling equipment/Communicate Risk of Fall with Harm to all staff/Discuss with provider need for PT consult/Monitor gait and stability/Reinforce activity limits and safety measures with patient and family/Tailored Fall Risk Interventions/Visual Cue: Yellow wristband and red socks/Bed in lowest position, wheels locked, appropriate side rails in place/Call bell, personal items and telephone in reach/Instruct patient to call for assistance before getting out of bed or chair/Non-slip footwear when patient is out of bed/[place] to call system/Physically safe environment - no spills, clutter or unnecessary equipment/Purposeful Proactive Rounding/Room/bathroom lighting operational, light cord in reach

## 2024-04-16 NOTE — PROGRESS NOTE ADULT - ASSESSMENT
89 y/o F with PMHx dementia with behavioural disturbance, CAD, HLD, HTN, hypothyroidism, PVC who is here s/p fall admitted for further work up.    # Unwitnessed fall    - EKG with T wave inversion V1, V2  - CT pelvis: Bilateral hip osteoarthritis. No fracture.   -Chest X ray: No acute pathology  -Pelvis Xray: No fracture  - Left femur  Xray: No fracture    - Left knee Xray: Severe degenerative narrowing of the medial joint compartment. Moderate suprapatellar joint effusion. No fracture  -ECHO with EF 55-60%, severe aortic valve stenosis  -RVP neg  -UA neg  -Creatine kinase neg   - Bacitracin for scalp laceration  - Lidocaine patch, acetaminophen for pain   - F/up B12, folate    - Fall precaution    -OT recommends [facility]IKIS  -PT recommends [facility]  -F/up orthostatic vitals     # Mesenteric panniculitis   - per CT    - outpatient  f/u    # Irregular lobular contour of the uterus  - per CT  - outpatient f/u     # MAYA    -Improved  - Cr 1.42 ( baseline1.16 ) >>1.23  - BUN 29 >> 22  - monitor kidney function   -  hold ARB, gabapentin     # MHx CAD  # MHx HLD  # MHx HTN   - c/w amlodipine  - c/w statin   - c/w metoprolol     # MHx dementia with behavioural disturbance   - c/w quetiapine  - c/w donepezil  - c/w memantin    #MHx hypothyroidism  - c/w synthroid  -TSH wnl    #MHx GERD  - c/w PPI    # DVT ppx    - SCD for now, pending CT head      Case dw Dr. Dorman 89 y/o F with PMHx dementia with behavioural disturbance, CAD, HLD, HTN, hypothyroidism, PVC who is here s/p fall admitted for further work up.    # Unwitnessed fall    - EKG with T wave inversion V1, V2  - CT pelvis: Bilateral hip osteoarthritis. No fracture.   -Chest X ray: No acute pathology  -Pelvis Xray: No fracture  - Left femur  Xray: No fracture    - Left knee Xray: Severe degenerative narrowing of the medial joint compartment. Moderate suprapatellar joint effusion. No fracture  -ECHO with EF 55-60%, severe aortic valve stenosis  -RVP neg  -UA neg  -Creatine kinase neg   - Bacitracin for scalp laceration  - Lidocaine patch, acetaminophen for pain   - F/up B12, folate    - Fall precaution    -OT recommends CHIKIS  -PT recommends [facility]  -F/up orthostatic vitals  -F/up cardio consult     # Mesenteric panniculitis   - per CT    - outpatient  f/u    # Irregular lobular contour of the uterus  - per CT  - outpatient f/u     # MAYA    -Improved  - Cr 1.42 ( baseline1.16 ) >>1.23  - BUN 29 >> 22  - monitor kidney function   -  hold ARB, gabapentin     # MHx CAD  # MHx HLD  # MHx HTN   - c/w amlodipine  - c/w statin   - c/w metoprolol     # MHx dementia with behavioural disturbance   - c/w quetiapine  - c/w donepezil  - c/w memantin    #MHx hypothyroidism  - c/w synthroid  -TSH wnl    #MHx GERD  - c/w PPI    # DVT ppx    - SCD for now, pending CT head      Case dw Dr. Dorman

## 2024-04-16 NOTE — OCCUPATIONAL THERAPY INITIAL EVALUATION ADULT - TRANSFER TRAINING, PT EVAL
Pt will perform sit to stand transfers with moderate assistance in prep for LE dressing in 2-3 tx sessions.

## 2024-04-16 NOTE — H&P ADULT - NSICDXPASTMEDICALHX_GEN_ALL_CORE_FT
PAST MEDICAL HISTORY:  Alzheimers disease     Anemia     Benign essential HTN     CAD (coronary artery disease)     HLD (hyperlipidemia)     Hypothyroidism     PVC (premature ventricular contraction)

## 2024-04-16 NOTE — PHYSICAL THERAPY INITIAL EVALUATION ADULT - ADDITIONAL COMMENTS
pt is a questionable historian, states she lives in private house, 5 jag w/rail, 1st floor setup, dtr lives on 2nd floor. pt uses rollator to ambulate, dtr assists w/ADL's

## 2024-04-16 NOTE — PATIENT PROFILE ADULT - NSPROPTRIGHTNOTIFY_GEN_A_NUR
89 yo woman on home ASA p/w hematochezia not localized on CTA, repeat colonoscopy, tagged RBC scan, or capsule endoscopy. The patient continues to require transfusion in the setting of unsuccessful attempts to localize the source of bleeding. Surgical intervention without localization would require a total colectomy with end ileostomy, which in this 89 yo patient would be likely extremely morbid and only undertaken as a last resort in the face of hemodynamic collapse. Recommend IR consultation for localization and embolization of the source of bleeding.    -no surgical intervention indicated  -consult IR for localization, embolization  -cont to transfuse for goal Hgb of 7  -B team surgery will cont to follow, [id number] 89 yo woman on home ASA p/w hematochezia not localized on CTA, repeat colonoscopy, tagged RBC scan, or capsule endoscopy. The patient continues to require transfusion in the setting of unsuccessful attempts to localize the source of bleeding. Surgical intervention without localization would require a total colectomy with end ileostomy, which in this 89 yo patient would be likely extremely morbid and only undertaken as a last resort in the face of hemodynamic instability. Recommend IR consultation for localization and embolization of the source of bleeding.    -no surgical intervention indicated  -consult IR for localization, embolization  -cont to transfuse for goal Hgb of 7  -B team surgery will cont to follow, [id number] 89 yo woman on home ASA p/w hematochezia not localized on CTA, repeat colonoscopy, tagged RBC scan, or capsule endoscopy. The patient continues to require transfusion in the setting of unsuccessful attempts to localize the source of bleeding. Surgical intervention without localization would require a total colectomy with end ileostomy, which in this 89 yo patient would be likely extremely morbid and only undertaken as a last resort in the face of hemodynamic instability. Recommend IR consultation for localization and embolization of the source of bleeding.    I also spoke extensively with the patient regarding her wishes for further interventions. I explained that surgery would potentially include a colectomy with possible ileostomy, and that in the absence of localization, even this intervention would not be guaranteed to stop the bleeding. She stated that she does not want to proceed with surgery.    -no surgical intervention indicated  -consult IR for localization, embolization  -cont to transfuse for goal Hgb of 7  -B team surgery will cont to follow, [id number] information could not be obtained

## 2024-04-17 LAB
ALBUMIN SERPL ELPH-MCNC: 2.9 G/DL — LOW (ref 3.3–5)
ALP SERPL-CCNC: 103 U/L — SIGNIFICANT CHANGE UP (ref 40–120)
ALT FLD-CCNC: 13 U/L — SIGNIFICANT CHANGE UP (ref 10–45)
ANION GAP SERPL CALC-SCNC: 7 MMOL/L — SIGNIFICANT CHANGE UP (ref 5–17)
AST SERPL-CCNC: 16 U/L — SIGNIFICANT CHANGE UP (ref 10–40)
BILIRUB SERPL-MCNC: 0.7 MG/DL — SIGNIFICANT CHANGE UP (ref 0.2–1.2)
BUN SERPL-MCNC: 24 MG/DL — HIGH (ref 7–23)
CALCIUM SERPL-MCNC: 8.9 MG/DL — SIGNIFICANT CHANGE UP (ref 8.4–10.5)
CHLORIDE SERPL-SCNC: 108 MMOL/L — SIGNIFICANT CHANGE UP (ref 96–108)
CO2 SERPL-SCNC: 28 MMOL/L — SIGNIFICANT CHANGE UP (ref 22–31)
CREAT SERPL-MCNC: 1.3 MG/DL — SIGNIFICANT CHANGE UP (ref 0.5–1.3)
EGFR: 40 ML/MIN/1.73M2 — LOW
GLUCOSE SERPL-MCNC: 103 MG/DL — HIGH (ref 70–99)
HCT VFR BLD CALC: 34.4 % — LOW (ref 34.5–45)
HGB BLD-MCNC: 11.5 G/DL — SIGNIFICANT CHANGE UP (ref 11.5–15.5)
MCHC RBC-ENTMCNC: 32.1 PG — SIGNIFICANT CHANGE UP (ref 27–34)
MCHC RBC-ENTMCNC: 33.4 GM/DL — SIGNIFICANT CHANGE UP (ref 32–36)
MCV RBC AUTO: 96.1 FL — SIGNIFICANT CHANGE UP (ref 80–100)
NRBC # BLD: 0 /100 WBCS — SIGNIFICANT CHANGE UP (ref 0–0)
PLATELET # BLD AUTO: 193 K/UL — SIGNIFICANT CHANGE UP (ref 150–400)
POTASSIUM SERPL-MCNC: 4.4 MMOL/L — SIGNIFICANT CHANGE UP (ref 3.5–5.3)
POTASSIUM SERPL-SCNC: 4.4 MMOL/L — SIGNIFICANT CHANGE UP (ref 3.5–5.3)
PROT SERPL-MCNC: 6.4 G/DL — SIGNIFICANT CHANGE UP (ref 6–8.3)
RBC # BLD: 3.58 M/UL — LOW (ref 3.8–5.2)
RBC # FLD: 12.1 % — SIGNIFICANT CHANGE UP (ref 10.3–14.5)
SODIUM SERPL-SCNC: 143 MMOL/L — SIGNIFICANT CHANGE UP (ref 135–145)
WBC # BLD: 6.87 K/UL — SIGNIFICANT CHANGE UP (ref 3.8–10.5)
WBC # FLD AUTO: 6.87 K/UL — SIGNIFICANT CHANGE UP (ref 3.8–10.5)

## 2024-04-17 PROCEDURE — 99233 SBSQ HOSP IP/OBS HIGH 50: CPT | Mod: GC

## 2024-04-17 PROCEDURE — 99232 SBSQ HOSP IP/OBS MODERATE 35: CPT

## 2024-04-17 RX ORDER — GABAPENTIN 400 MG/1
300 CAPSULE ORAL
Refills: 0 | Status: DISCONTINUED | OUTPATIENT
Start: 2024-04-17 | End: 2024-04-22

## 2024-04-17 RX ADMIN — Medication 1 APPLICATION(S): at 05:06

## 2024-04-17 RX ADMIN — Medication 975 MILLIGRAM(S): at 10:50

## 2024-04-17 RX ADMIN — MEMANTINE HYDROCHLORIDE 5 MILLIGRAM(S): 10 TABLET ORAL at 05:04

## 2024-04-17 RX ADMIN — QUETIAPINE FUMARATE 25 MILLIGRAM(S): 200 TABLET, FILM COATED ORAL at 05:04

## 2024-04-17 RX ADMIN — Medication 975 MILLIGRAM(S): at 09:17

## 2024-04-17 RX ADMIN — Medication 650 MILLIGRAM(S): at 18:42

## 2024-04-17 RX ADMIN — Medication 1 APPLICATION(S): at 17:38

## 2024-04-17 RX ADMIN — QUETIAPINE FUMARATE 25 MILLIGRAM(S): 200 TABLET, FILM COATED ORAL at 17:38

## 2024-04-17 RX ADMIN — DONEPEZIL HYDROCHLORIDE 5 MILLIGRAM(S): 10 TABLET, FILM COATED ORAL at 21:23

## 2024-04-17 RX ADMIN — LOSARTAN POTASSIUM 100 MILLIGRAM(S): 100 TABLET, FILM COATED ORAL at 05:05

## 2024-04-17 RX ADMIN — ATORVASTATIN CALCIUM 20 MILLIGRAM(S): 80 TABLET, FILM COATED ORAL at 21:23

## 2024-04-17 RX ADMIN — AMLODIPINE BESYLATE 5 MILLIGRAM(S): 2.5 TABLET ORAL at 05:05

## 2024-04-17 RX ADMIN — Medication 25 MICROGRAM(S): at 05:05

## 2024-04-17 RX ADMIN — MEMANTINE HYDROCHLORIDE 5 MILLIGRAM(S): 10 TABLET ORAL at 17:38

## 2024-04-17 RX ADMIN — Medication 25 MILLIGRAM(S): at 05:04

## 2024-04-17 RX ADMIN — LIDOCAINE 1 PATCH: 4 CREAM TOPICAL at 07:43

## 2024-04-17 RX ADMIN — Medication 1 MILLIGRAM(S): at 11:43

## 2024-04-17 RX ADMIN — ENOXAPARIN SODIUM 40 MILLIGRAM(S): 100 INJECTION SUBCUTANEOUS at 05:04

## 2024-04-17 RX ADMIN — LIDOCAINE 1 PATCH: 4 CREAM TOPICAL at 17:33

## 2024-04-17 RX ADMIN — CITALOPRAM 10 MILLIGRAM(S): 10 TABLET, FILM COATED ORAL at 11:43

## 2024-04-17 RX ADMIN — GABAPENTIN 300 MILLIGRAM(S): 400 CAPSULE ORAL at 17:38

## 2024-04-17 RX ADMIN — PANTOPRAZOLE SODIUM 40 MILLIGRAM(S): 20 TABLET, DELAYED RELEASE ORAL at 05:08

## 2024-04-17 RX ADMIN — Medication 650 MILLIGRAM(S): at 17:38

## 2024-04-17 RX ADMIN — LIDOCAINE 1 PATCH: 4 CREAM TOPICAL at 05:07

## 2024-04-17 NOTE — PROGRESS NOTE ADULT - SUBJECTIVE AND OBJECTIVE BOX
89 y/o F with PMHx dementia with behavioural disturbance, CAD, HLD, HTN, hypothyroidism, PVC who is here s/p fall admitted for further work up.    Patient seen and examined at bedside.     REVIEW OF SYSTEMS:  CONSTITUTIONAL: (-) weakness, (-) fevers, (-) chills  EYES/ENT: (-) visual changes,  (-) vertigo,  (-) throat pain   NECK:  (-) pain, (-) stiffness  RESPIRATORY:  (-) shortness of breath, (-) cough,  (-) wheezing,  (-) hemoptysis   CARDIOVASCULAR:  (-) chest pain, (-) palpitations  GASTROINTESTINAL:  (-) abdominal or epigastric pain, (-) nausea, (-) vomiting, (-) diarrhea, (-) constipation, (-) melena,  (-) hematemesis,  (-) hematochezia  GENITOURINARY: (-) dysuria, (-) frequency, (-) hematuria  NEUROLOGICAL: (-) numbness, (-) weakness  SKIN: (-) itching, (-) rashes, (-) lesions    PHYSICAL EXAM:  Vital Signs Last 24 Hrs  T(C): 36.7 (2024 05:12), Max: 36.7 (2024 05:12)  T(F): 98.1 (2024 05:12), Max: 98.1 (2024 05:12)  HR: 65 (2024 05:12) (56 - 70)  BP: 110/68 (2024 05:12) (99/62 - 148/68)  RR: 16 (2024 05:12) (16 - 17)  SpO2: 96% (2024 05:12) (94% - 96%)    Parameters below as of 2024 05:12  Patient On (Oxygen Delivery Method): room air      PHYSICAL EXAM:  GENERAL: NAD, lying in bed comfortably  HEAD:  Atraumatic, Normocephalic  RESP: Clear to auscultation bilaterally, good air entry bilaterally; No wheezing, rales, or rhonchi. Unlabored respirations  CARDIAC: Regular rate and rhythm. S1 and S2. No murmurs, rubs, or gallops  GI:  Soft, Nontender, Nondistended. Bowel sounds present x4 quadrants; No hepatomegaly. No splenomegaly.  EXTREMITIES:  2+ Peripheral Pulses. Capillary refill <2 seconds. No clubbing, cyanosis, or edema  NERVOUS SYSTEM:  Alert & Oriented X3, speech clear. No deficits   MSK: FROM all 4 extremities, full and equal strength  SKIN: No rashes, bruises, or other lesions    MEDICATIONS  (STANDING):  amLODIPine   Tablet 5 milliGRAM(s) Oral daily  atorvastatin 20 milliGRAM(s) Oral at bedtime  bacitracin   Ointment 1 Application(s) Topical two times a day  citalopram 10 milliGRAM(s) Oral daily  donepezil 5 milliGRAM(s) Oral at bedtime  enoxaparin Injectable 40 milliGRAM(s) SubCutaneous every 24 hours  folic acid 1 milliGRAM(s) Oral daily  levothyroxine 25 MICROGram(s) Oral daily  lidocaine   4% Patch 1 Patch Transdermal every 24 hours  losartan 100 milliGRAM(s) Oral daily  memantine 5 milliGRAM(s) Oral two times a day  metoprolol succinate ER 25 milliGRAM(s) Oral daily  pantoprazole    Tablet 40 milliGRAM(s) Oral before breakfast  QUEtiapine 25 milliGRAM(s) Oral two times a day    MEDICATIONS  (PRN):  acetaminophen     Tablet .. 975 milliGRAM(s) Oral every 6 hours PRN Moderate Pain (4 - 6)  acetaminophen     Tablet .. 650 milliGRAM(s) Oral every 6 hours PRN Temp greater or equal to 38C (100.4F), Mild Pain (1 - 3)  aluminum hydroxide/magnesium hydroxide/simethicone Suspension 30 milliLiter(s) Oral every 4 hours PRN Dyspepsia  melatonin 3 milliGRAM(s) Oral at bedtime PRN Insomnia  ondansetron Injectable 4 milliGRAM(s) IV Push every 8 hours PRN Nausea and/or Vomiting    LABS                   12.2   7.96  )-----------( 201      ( 2024 05:42 )             35.9     2024 05:42    141    |  104    |  22     ----------------------------<  101    3.9     |  32     |  1.23     Ca    8.7        2024 05:42  Phos  3.2       2024 00:01  Mg     1.5       2024 00:01    TPro  6.6    /  Alb  3.1    /  TBili  0.8    /  DBili  x      /  AST  15     /  ALT  14     /  AlkPhos  98     2024 05:42    PT/INR - ( 15 Apr 2024 21:30 )   PT: 12.2 sec;   INR: 1.04 ratio         PTT - ( 15 Apr 2024 21:30 )  PTT:31.2 sec  CAPILLARY BLOOD GLUCOSE      LIVER FUNCTIONS - ( 2024 05:42 )  Alb: 3.1 g/dL / Pro: 6.6 g/dL / ALK PHOS: 98 U/L / ALT: 14 U/L / AST: 15 U/L / GGT: x           Urinalysis Basic - ( 2024 06:00 )    Color: Yellow / Appearance: Clear / S.014 / pH: x  Gluc: x / Ketone: Negative mg/dL  / Bili: Negative / Urobili: 1.0 mg/dL   Blood: x / Protein: Negative mg/dL / Nitrite: Negative   Leuk Esterase: Moderate / RBC: 1 /HPF / WBC 0 /HPF   Sq Epi: x / Non Sq Epi: x / Bacteria: Few /HPF        CT HEAD  IMPRESSION:    Stable appearance of the brain.    No acute intracranial abnormalities.    Small vessel and atrophic changes.    Moderately advanced degenerative changes throughout cervical spine    No vertebral fracture, collapse or subluxation.    --- End of Report ---    WALDO CRUZ MD; Attending Radiologist  This document has been electronically signed. 2024  2:50AM                             87 y/o F with PMHx dementia with behavioural disturbance, CAD, HLD, HTN, hypothyroidism, PVC who is here s/p fall admitted for further work up.    Patient seen and examined at bedside. Patient states that the light in the room was bothering her. Pleasantly confused at times    REVIEW OF SYSTEMS:  CONSTITUTIONAL: (-) weakness, (-) fevers, (-) chills  EYES/ENT: (-) visual changes,  (-) vertigo,  (-) throat pain   NECK:  (-) pain, (-) stiffness  RESPIRATORY:  (-) shortness of breath, (-) cough,  (-) wheezing,  (-) hemoptysis   CARDIOVASCULAR:  (-) chest pain, (-) palpitations  GASTROINTESTINAL:  (-) abdominal or epigastric pain, (-) nausea, (-) vomiting, (-) diarrhea, (-) constipation, (-) melena,  (-) hematemesis,  (-) hematochezia  GENITOURINARY: (-) dysuria, (-) frequency, (-) hematuria  NEUROLOGICAL: (-) numbness, (-) weakness  SKIN: (-) itching, (-) rashes, (-) lesions    PHYSICAL EXAM:  Vital Signs Last 24 Hrs  T(C): 36.7 (17 Apr 2024 05:12), Max: 36.7 (17 Apr 2024 05:12)  T(F): 98.1 (17 Apr 2024 05:12), Max: 98.1 (17 Apr 2024 05:12)  HR: 65 (17 Apr 2024 05:12) (56 - 70)  BP: 110/68 (17 Apr 2024 05:12) (99/62 - 148/68)  RR: 16 (17 Apr 2024 05:12) (16 - 17)  SpO2: 96% (17 Apr 2024 05:12) (94% - 96%)    Parameters below as of 17 Apr 2024 05:12  Patient On (Oxygen Delivery Method): room air      PHYSICAL EXAM:  GENERAL: NAD, lying in bed comfortably  HEAD:  Atraumatic, Normocephalic  RESP: Clear to auscultation bilaterally, good air entry bilaterally; No wheezing, rales, or rhonchi. Unlabored respirations  CARDIAC: Harsh systolic murmur   GI:  Soft, Nontender, Nondistended. Bowel sounds present x4 quadrants; No hepatomegaly. No splenomegaly.  EXTREMITIES:  2+ Peripheral Pulses. Capillary refill <2 seconds. No clubbing, cyanosis, or edema  NERVOUS SYSTEM:  Alert & Oriented X3, however not oriented to situation, pleasantly confused at times  MSK: FROM all 4 extremities, full and equal strength  SKIN: No rashes, bruises, or other lesions    MEDICATIONS  (STANDING):  amLODIPine   Tablet 5 milliGRAM(s) Oral daily  atorvastatin 20 milliGRAM(s) Oral at bedtime  bacitracin   Ointment 1 Application(s) Topical two times a day  citalopram 10 milliGRAM(s) Oral daily  donepezil 5 milliGRAM(s) Oral at bedtime  enoxaparin Injectable 40 milliGRAM(s) SubCutaneous every 24 hours  folic acid 1 milliGRAM(s) Oral daily  levothyroxine 25 MICROGram(s) Oral daily  lidocaine   4% Patch 1 Patch Transdermal every 24 hours  losartan 100 milliGRAM(s) Oral daily  memantine 5 milliGRAM(s) Oral two times a day  metoprolol succinate ER 25 milliGRAM(s) Oral daily  pantoprazole    Tablet 40 milliGRAM(s) Oral before breakfast  QUEtiapine 25 milliGRAM(s) Oral two times a day    MEDICATIONS  (PRN):  acetaminophen     Tablet .. 975 milliGRAM(s) Oral every 6 hours PRN Moderate Pain (4 - 6)  acetaminophen     Tablet .. 650 milliGRAM(s) Oral every 6 hours PRN Temp greater or equal to 38C (100.4F), Mild Pain (1 - 3)  aluminum hydroxide/magnesium hydroxide/simethicone Suspension 30 milliLiter(s) Oral every 4 hours PRN Dyspepsia  melatonin 3 milliGRAM(s) Oral at bedtime PRN Insomnia  ondansetron Injectable 4 milliGRAM(s) IV Push every 8 hours PRN Nausea and/or Vomiting    LABS        CARDIAC MARKERS ( 16 Apr 2024 00:01 )  x     / x     / 127 U/L / x     / x                         11.5   6.87  )-----------( 193      ( 17 Apr 2024 05:57 )             34.4     17 Apr 2024 05:57    143    |  108    |  24     ----------------------------<  103    4.4     |  28     |  1.30     Ca    8.9        17 Apr 2024 05:57  Phos  3.2       16 Apr 2024 00:01  Mg     1.5       16 Apr 2024 00:01    TPro  6.4    /  Alb  2.9    /  TBili  0.7    /  DBili  x      /  AST  16     /  ALT  13     /  AlkPhos  103    17 Apr 2024 05:57    PT/INR - ( 15 Apr 2024 21:30 )   PT: 12.2 sec;   INR: 1.04 ratio      PTT - ( 15 Apr 2024 21:30 )  PTT:31.2 sec  CAPILLARY BLOOD GLUCOSE    LIVER FUNCTIONS - ( 17 Apr 2024 05:57 )  Alb: 2.9 g/dL / Pro: 6.4 g/dL / ALK PHOS: 103 U/L / ALT: 13 U/L / AST: 16 U/L / GGT: x           Urinalysis Basic - ( 17 Apr 2024 05:57 )    Color: x / Appearance: x / SG: x / pH: x  Gluc: 103 mg/dL / Ketone: x  / Bili: x / Urobili: x   Blood: x / Protein: x / Nitrite: x   Leuk Esterase: x / RBC: x / WBC x   Sq Epi: x / Non Sq Epi: x / Bacteria: x      CT HEAD  IMPRESSION:    Stable appearance of the brain.    No acute intracranial abnormalities.    Small vessel and atrophic changes.    Moderately advanced degenerative changes throughout cervical spine    No vertebral fracture, collapse or subluxation.    --- End of Report ---    WALDO CRUZ MD; Attending Radiologist  This document has been electronically signed. Apr 16 2024  2:50AM

## 2024-04-17 NOTE — PROGRESS NOTE ADULT - NS ATTEND AMEND GEN_ALL_CORE FT
I have seen and examined the patient. I have discussed case with RONALD Kessler.    Chelly Moss is an 88 year old woman with past medical history of Mild Dementia, Coronary artery disease, Hypertension and Hyperlipidemia who presented after mechanical fall, found to have severe aortic valve stenosis.    Cardiology consulted due to severe aortic valve stenosis. ECG consistent with sinus rhythm and nonspecific ST abnormalities. Echo consistent with normal LVEF 55-60%, mild LVH, severe aortic valve stenosis. CT head with no acute findings. CXR clear and xray imaging with no acute fractures. The patient denies angina or dyspnea, no clinical signs of congestive heart failure, however given recent fall cannot entirely rule out prior syncope. Physical exam notable for harsh systolic ejection murmur radiating to carotids. Overall, patient has severe aortic stenosis and it is not entirely clear if she is symptomatic from this given her mild dementia. Discussed with her daughter/HCP Rosmery ([phone]) about TAVR including risks/benefits/alternatives and daughter appears amenable, however patient would like to think about this further.     Will plan for outpatient TAVR evaluation if within goals of care with patient.     Diogo Calle MD  Cardiology

## 2024-04-17 NOTE — PROGRESS NOTE ADULT - ASSESSMENT
87 y/o F with PMHx dementia with behavioural disturbance, CAD, HLD, HTN, hypothyroidism, PVC who is here s/p fall admitted for further work up.    # Unwitnessed fall    - EKG with T wave inversion V1, V2  - CT pelvis: Bilateral hip osteoarthritis. No fracture.   -Chest X ray: No acute pathology  -Pelvis Xray: No fracture  - Left femur  Xray: No fracture    - Left knee Xray: Severe degenerative narrowing of the medial joint compartment. Moderate suprapatellar joint effusion. No fracture  -ECHO with EF 55-60%, severe aortic valve stenosis  -RVP neg  -UA neg  -Creatine kinase neg   - Bacitracin for scalp laceration  - Lidocaine patch, acetaminophen for pain     - Fall precautions    -Cardiology recommendations appreciated  -OT recommends [facility]  -PT recommends [facility]  -F/up orthostatic vitals    #Severe Aortic Valve Stenosis   -ECHO with EF 55-60%, severe aortic valve stenosis  -May be symptomatic in setting of frequent falls, near syncope  -Cardiology following  -Pt may need TAVR, cardio to discuss options with patient and HCP     # Mesenteric panniculitis   - per CT   - outpatient  f/u    # Irregular lobular contour of the uterus  - per CT  - outpatient f/u     # MAYA    -Improved  - Cr 1.42 ( baseline1.16 ) >>1.23  - BUN 29 >> 22  - monitor kidney function   -  hold ARB, gabapentin     # MHx CAD  # MHx HLD  # MHx HTN   - c/w amlodipine  - c/w statin   - c/w metoprolol     # MHx dementia with behavioural disturbance   - c/w quetiapine  - c/w donepezil  - c/w memantin    #MHx hypothyroidism  - c/w synthroid  -TSH wnl    #MHx GERD  - c/w PPI    # DVT ppx    - Lovenox    Dispo: PT/OT recommend [facility] once medically optimized     Case dw Dr. Dorman 87 y/o F with PMHx dementia with behavioural disturbance, CAD, HLD, HTN, hypothyroidism, PVC who is here s/p fall admitted for further work up.    # Unwitnessed fall    - EKG with T wave inversion V1, V2  - CT pelvis: Bilateral hip osteoarthritis. No fracture.   -Chest X ray: No acute pathology  -Pelvis Xray: No fracture  - Left femur  Xray: No fracture    - Left knee Xray: Severe degenerative narrowing of the medial joint compartment. Moderate suprapatellar joint effusion. No fracture  -ECHO with EF 55-60%, severe aortic valve stenosis  -RVP neg  -UA neg  -Creatine kinase neg   - Bacitracin for scalp laceration  - Lidocaine patch, acetaminophen for pain     - Fall precautions    -Cardiology recommendations appreciated  -OT recommends [facility]  -PT recommends [facility]  -F/up orthostatic vitals    #Severe Aortic Valve Stenosis   -ECHO with EF 55-60%, severe aortic valve stenosis  -May be symptomatic in setting of frequent falls, near syncope  -Cardiology following  -Pt may need TAVR, cardio to discuss options with patient and HCP     # Mesenteric panniculitis   - per CT   - outpatient  f/u    # Irregular lobular contour of the uterus  - per CT  - outpatient f/u     # MAYA    -Improved  - Cr 1.42 ( baseline1.16 ) >>1.23  - BUN 29 >> 22  - monitor kidney function   -  hold ARB    # MHx CAD  # MHx HLD  # MHx HTN   - c/w amlodipine  - c/w statin   - c/w metoprolol     # MHx dementia with behavioural disturbance   - c/w quetiapine  - c/w donepezil  - c/w memantin    #MHx hypothyroidism  - c/w synthroid  -TSH wnl    #MHx GERD  - c/w PPI    # DVT ppx    - Lovenox    Dispo: PT/OT recommend [facility] once medically optimized     Case dw Dr. Dorman

## 2024-04-17 NOTE — PROGRESS NOTE ADULT - SUBJECTIVE AND OBJECTIVE BOX
LEEROY GIL  [id number]    Chief Complaint: Fall/ Severe AS   Interval events: pt seen and examined, labs and chart reviewed. no cardiopulmonary complaints. pt not on tele    ALLERGIES:  penicillin (Unknown)      PAST MEDICAL & SURGICAL HISTORY:  Anemia  Alzheimers disease  CAD (coronary artery disease)  Hypothyroidism  HLD (hyperlipidemia)  Benign essential HTN  PVC (premature ventricular contraction)  History of left knee surgery      CURRENT MEDICATIONS:  MEDICATIONS  (STANDING):  amLODIPine   Tablet 5 milliGRAM(s) Oral daily  atorvastatin 20 milliGRAM(s) Oral at bedtime  bacitracin   Ointment 1 Application(s) Topical two times a day  citalopram 10 milliGRAM(s) Oral daily  donepezil 5 milliGRAM(s) Oral at bedtime  enoxaparin Injectable 40 milliGRAM(s) SubCutaneous every 24 hours  folic acid 1 milliGRAM(s) Oral daily  levothyroxine 25 MICROGram(s) Oral daily  lidocaine   4% Patch 1 Patch Transdermal every 24 hours  losartan 100 milliGRAM(s) Oral daily  magnesium oxide 400 milliGRAM(s) Oral once  memantine 5 milliGRAM(s) Oral two times a day  metoprolol succinate ER 25 milliGRAM(s) Oral daily  pantoprazole    Tablet 40 milliGRAM(s) Oral before breakfast  QUEtiapine 25 milliGRAM(s) Oral two times a day      ROS:  All 10 systems reviewed and positives noted in HPI    OBJECTIVE:    VITAL SIGNS:  Vital Signs Last 24 Hrs  T(C): 36.4 (16 Apr 2024 05:04), Max: 36.7 (16 Apr 2024 03:16)  T(F): 97.5 (16 Apr 2024 05:04), Max: 98.1 (16 Apr 2024 03:16)  HR: 72 (16 Apr 2024 05:04) (68 - 78)  BP: 154/79 (16 Apr 2024 05:04) (144/61 - 169/77)  BP(mean): --  RR: 16 (16 Apr 2024 05:04) (16 - 20)  SpO2: 96% (16 Apr 2024 05:04) (96% - 98%)    Parameters below as of 16 Apr 2024 05:04  Patient On (Oxygen Delivery Method): room air      PHYSICAL EXAM:  General:  no distress  HEENT: sclera anicteric  Neck: supple, no carotid bruits b/l  CVS: JVP ~ 7 cm H20, RRR, s1, s2, +murmur  Chest: unlabored respirations, clear to auscultation b/l  Abdomen: non-distended  Extremities: no lower extremity edema b/l  Neuro: awake, alert & oriented x 2 person and place  Psych: normal affect      LABS:                        12.2   7.96  )-----------( 201      ( 16 Apr 2024 05:42 )             35.9     04-16    141  |  104  |  22  ----------------------------<  101<H>  3.9   |  32<H>  |  1.23    Ca    8.7      16 Apr 2024 05:42  Phos  3.2     04-16  Mg     1.5     04-16    TPro  6.6  /  Alb  3.1<L>  /  TBili  0.8  /  DBili  x   /  AST  15  /  ALT  14  /  AlkPhos  98  04-16    CARDIAC MARKERS ( 16 Apr 2024 00:01 )  x     / x     / 127 U/L / x     / x          PT/INR - ( 15 Apr 2024 21:30 )   PT: 12.2 sec;   INR: 1.04 ratio         PTT - ( 15 Apr 2024 21:30 )  PTT:31.2 sec      ECG: Sinus rhythm, nonspecific ST abnormalities      TTE: < from: TTE Echo Complete w/o Contrast w/ Doppler (04.16.24 @ 08:53) >   1. Normal global left ventricular systolic function.   2. Left ventricular ejection fraction, by visual estimation, is 55 to 60%.   3. Mildly increased LV wall thickness.   4. Normal left ventricular internal cavity size.   5. Normal right ventricular size and function.   6. The left atrium is normal in size.   7. The right atrium is normal in size.   8. Mild mitral annular calcification.   9. Moderate thickening and calcification of the anterior and posterior mitral valve leaflets.  10. Mild mitral valve regurgitation.  11. Mild tricuspid regurgitation.  12. Aortic valve leafletcalcification with restricted leaflet opening.   Severe aortic valve stenosis (peak velocity 2.9 m/s, mean gradient 21   mmHg, calculated KIKI by continuity equation 0.8 cm^2, DI 0.24).  13. There is no evidence of pericardial effusion.         LEEROY GIL  [id number]    Chief Complaint: Fall/ Severe AS   Interval events: Patient seen and examined, labs and chart reviewed. no cardiopulmonary complaints. Patient not on tele    ALLERGIES:  penicillin (Unknown)      PAST MEDICAL & SURGICAL HISTORY:  Anemia  Alzheimers disease  CAD (coronary artery disease)  Hypothyroidism  HLD (hyperlipidemia)  Benign essential HTN  PVC (premature ventricular contraction)  History of left knee surgery      CURRENT MEDICATIONS:  MEDICATIONS  (STANDING):  amLODIPine   Tablet 5 milliGRAM(s) Oral daily  atorvastatin 20 milliGRAM(s) Oral at bedtime  bacitracin   Ointment 1 Application(s) Topical two times a day  citalopram 10 milliGRAM(s) Oral daily  donepezil 5 milliGRAM(s) Oral at bedtime  enoxaparin Injectable 40 milliGRAM(s) SubCutaneous every 24 hours  folic acid 1 milliGRAM(s) Oral daily  levothyroxine 25 MICROGram(s) Oral daily  lidocaine   4% Patch 1 Patch Transdermal every 24 hours  losartan 100 milliGRAM(s) Oral daily  magnesium oxide 400 milliGRAM(s) Oral once  memantine 5 milliGRAM(s) Oral two times a day  metoprolol succinate ER 25 milliGRAM(s) Oral daily  pantoprazole    Tablet 40 milliGRAM(s) Oral before breakfast  QUEtiapine 25 milliGRAM(s) Oral two times a day      ROS:  All 10 systems reviewed and positives noted in HPI    OBJECTIVE:    VITAL SIGNS:  Vital Signs Last 24 Hrs  T(C): 36.4 (16 Apr 2024 05:04), Max: 36.7 (16 Apr 2024 03:16)  T(F): 97.5 (16 Apr 2024 05:04), Max: 98.1 (16 Apr 2024 03:16)  HR: 72 (16 Apr 2024 05:04) (68 - 78)  BP: 154/79 (16 Apr 2024 05:04) (144/61 - 169/77)  BP(mean): --  RR: 16 (16 Apr 2024 05:04) (16 - 20)  SpO2: 96% (16 Apr 2024 05:04) (96% - 98%)    Parameters below as of 16 Apr 2024 05:04  Patient On (Oxygen Delivery Method): room air      PHYSICAL EXAM:  General:  no distress  HEENT: sclera anicteric  Neck: supple  CVS: JVP ~ 7 cm H20, RRR, s1, s2, +murmur  Chest: unlabored respirations, clear to auscultation b/l  Abdomen: non-distended  Extremities: no lower extremity edema b/l  Neuro: awake, alert & oriented x 2 person and place  Psych: normal affect      LABS:                        12.2   7.96  )-----------( 201      ( 16 Apr 2024 05:42 )             35.9     04-16    141  |  104  |  22  ----------------------------<  101<H>  3.9   |  32<H>  |  1.23    Ca    8.7      16 Apr 2024 05:42  Phos  3.2     04-16  Mg     1.5     04-16    TPro  6.6  /  Alb  3.1<L>  /  TBili  0.8  /  DBili  x   /  AST  15  /  ALT  14  /  AlkPhos  98  04-16    CARDIAC MARKERS ( 16 Apr 2024 00:01 )  x     / x     / 127 U/L / x     / x          PT/INR - ( 15 Apr 2024 21:30 )   PT: 12.2 sec;   INR: 1.04 ratio         PTT - ( 15 Apr 2024 21:30 )  PTT:31.2 sec      ECG: Sinus rhythm, nonspecific ST abnormalities      TTE: < from: TTE Echo Complete w/o Contrast w/ Doppler (04.16.24 @ 08:53) >   1. Normal global left ventricular systolic function.   2. Left ventricular ejection fraction, by visual estimation, is 55 to 60%.   3. Mildly increased LV wall thickness.   4. Normal left ventricular internal cavity size.   5. Normal right ventricular size and function.   6. The left atrium is normal in size.   7. The right atrium is normal in size.   8. Mild mitral annular calcification.   9. Moderate thickening and calcification of the anterior and posterior mitral valve leaflets.  10. Mild mitral valve regurgitation.  11. Mild tricuspid regurgitation.  12. Aortic valve leafletcalcification with restricted leaflet opening.   Severe aortic valve stenosis (peak velocity 2.9 m/s, mean gradient 21   mmHg, calculated KIKI by continuity equation 0.8 cm^2, DI 0.24).  13. There is no evidence of pericardial effusion.

## 2024-04-17 NOTE — PROGRESS NOTE ADULT - ASSESSMENT
Assessment 88-year-old female history of dementia, CAD, HLD, hypertension, hypothyroidism admitted status post fall.  Patient does not recall the mechanism of fall.  Denies any chest pain, palpitations, dizziness or shortness of breath.  Cardiology consulted for severe AS    Recommendations  EKG sinus with nonspecific ST abnormalities  TTE EF 55 to 60% and severe AS  Discussed the option of TAVR with pt and daughter who is amenable. Patient currently asymptomatic, hemodynamically stable, may followup with cardiology/structural heart outpatient     Pt CV stable. Cardiology will sign off, please reconsult as needed   Assessment 88-year-old female history of dementia, CAD, HLD, hypertension, hypothyroidism admitted status post fall.  Patient does not recall the mechanism of fall.  Denies any chest pain, palpitations, dizziness or shortness of breath.  Cardiology consulted for severe AS    Recommendations  EKG sinus with nonspecific ST abnormalities  TTE EF 55 to 60% and severe AS  Discussed the option of TAVR with patient and daughter, daughter appears amenable. Patient currently asymptomatic, hemodynamically stable, may followup with cardiology/structural heart outpatient

## 2024-04-18 LAB
ANION GAP SERPL CALC-SCNC: 8 MMOL/L — SIGNIFICANT CHANGE UP (ref 5–17)
BUN SERPL-MCNC: 33 MG/DL — HIGH (ref 7–23)
CALCIUM SERPL-MCNC: 8.9 MG/DL — SIGNIFICANT CHANGE UP (ref 8.4–10.5)
CHLORIDE SERPL-SCNC: 106 MMOL/L — SIGNIFICANT CHANGE UP (ref 96–108)
CO2 SERPL-SCNC: 26 MMOL/L — SIGNIFICANT CHANGE UP (ref 22–31)
CREAT SERPL-MCNC: 1.34 MG/DL — HIGH (ref 0.5–1.3)
EGFR: 39 ML/MIN/1.73M2 — LOW
GLUCOSE SERPL-MCNC: 83 MG/DL — SIGNIFICANT CHANGE UP (ref 70–99)
HCT VFR BLD CALC: 38.1 % — SIGNIFICANT CHANGE UP (ref 34.5–45)
HGB BLD-MCNC: 12.7 G/DL — SIGNIFICANT CHANGE UP (ref 11.5–15.5)
MCHC RBC-ENTMCNC: 32.2 PG — SIGNIFICANT CHANGE UP (ref 27–34)
MCHC RBC-ENTMCNC: 33.3 GM/DL — SIGNIFICANT CHANGE UP (ref 32–36)
MCV RBC AUTO: 96.5 FL — SIGNIFICANT CHANGE UP (ref 80–100)
NRBC # BLD: 0 /100 WBCS — SIGNIFICANT CHANGE UP (ref 0–0)
PLATELET # BLD AUTO: 192 K/UL — SIGNIFICANT CHANGE UP (ref 150–400)
POTASSIUM SERPL-MCNC: 4.7 MMOL/L — SIGNIFICANT CHANGE UP (ref 3.5–5.3)
POTASSIUM SERPL-SCNC: 4.7 MMOL/L — SIGNIFICANT CHANGE UP (ref 3.5–5.3)
RBC # BLD: 3.95 M/UL — SIGNIFICANT CHANGE UP (ref 3.8–5.2)
RBC # FLD: 12.1 % — SIGNIFICANT CHANGE UP (ref 10.3–14.5)
SODIUM SERPL-SCNC: 140 MMOL/L — SIGNIFICANT CHANGE UP (ref 135–145)
WBC # BLD: 9.64 K/UL — SIGNIFICANT CHANGE UP (ref 3.8–10.5)
WBC # FLD AUTO: 9.64 K/UL — SIGNIFICANT CHANGE UP (ref 3.8–10.5)

## 2024-04-18 PROCEDURE — 99233 SBSQ HOSP IP/OBS HIGH 50: CPT | Mod: GC

## 2024-04-18 RX ORDER — SODIUM CHLORIDE 9 MG/ML
1000 INJECTION INTRAMUSCULAR; INTRAVENOUS; SUBCUTANEOUS
Refills: 0 | Status: DISCONTINUED | OUTPATIENT
Start: 2024-04-18 | End: 2024-04-21

## 2024-04-18 RX ADMIN — GABAPENTIN 300 MILLIGRAM(S): 400 CAPSULE ORAL at 17:40

## 2024-04-18 RX ADMIN — ENOXAPARIN SODIUM 40 MILLIGRAM(S): 100 INJECTION SUBCUTANEOUS at 05:38

## 2024-04-18 RX ADMIN — Medication 1 APPLICATION(S): at 05:40

## 2024-04-18 RX ADMIN — Medication 975 MILLIGRAM(S): at 12:12

## 2024-04-18 RX ADMIN — AMLODIPINE BESYLATE 5 MILLIGRAM(S): 2.5 TABLET ORAL at 05:40

## 2024-04-18 RX ADMIN — QUETIAPINE FUMARATE 25 MILLIGRAM(S): 200 TABLET, FILM COATED ORAL at 17:40

## 2024-04-18 RX ADMIN — LIDOCAINE 1 PATCH: 4 CREAM TOPICAL at 12:15

## 2024-04-18 RX ADMIN — ATORVASTATIN CALCIUM 20 MILLIGRAM(S): 80 TABLET, FILM COATED ORAL at 21:44

## 2024-04-18 RX ADMIN — LIDOCAINE 1 PATCH: 4 CREAM TOPICAL at 05:38

## 2024-04-18 RX ADMIN — CITALOPRAM 10 MILLIGRAM(S): 10 TABLET, FILM COATED ORAL at 12:12

## 2024-04-18 RX ADMIN — Medication 975 MILLIGRAM(S): at 12:42

## 2024-04-18 RX ADMIN — MEMANTINE HYDROCHLORIDE 5 MILLIGRAM(S): 10 TABLET ORAL at 17:40

## 2024-04-18 RX ADMIN — PANTOPRAZOLE SODIUM 40 MILLIGRAM(S): 20 TABLET, DELAYED RELEASE ORAL at 05:40

## 2024-04-18 RX ADMIN — QUETIAPINE FUMARATE 25 MILLIGRAM(S): 200 TABLET, FILM COATED ORAL at 05:39

## 2024-04-18 RX ADMIN — Medication 975 MILLIGRAM(S): at 18:44

## 2024-04-18 RX ADMIN — LOSARTAN POTASSIUM 100 MILLIGRAM(S): 100 TABLET, FILM COATED ORAL at 05:39

## 2024-04-18 RX ADMIN — Medication 975 MILLIGRAM(S): at 19:14

## 2024-04-18 RX ADMIN — GABAPENTIN 300 MILLIGRAM(S): 400 CAPSULE ORAL at 05:38

## 2024-04-18 RX ADMIN — Medication 1 MILLIGRAM(S): at 12:12

## 2024-04-18 RX ADMIN — Medication 1 APPLICATION(S): at 17:40

## 2024-04-18 RX ADMIN — Medication 25 MICROGRAM(S): at 05:38

## 2024-04-18 RX ADMIN — LIDOCAINE 1 PATCH: 4 CREAM TOPICAL at 17:43

## 2024-04-18 RX ADMIN — DONEPEZIL HYDROCHLORIDE 5 MILLIGRAM(S): 10 TABLET, FILM COATED ORAL at 21:44

## 2024-04-18 RX ADMIN — MEMANTINE HYDROCHLORIDE 5 MILLIGRAM(S): 10 TABLET ORAL at 05:38

## 2024-04-18 NOTE — PROGRESS NOTE ADULT - ASSESSMENT
87 y/o F with PMHx dementia with behavioural disturbance, CAD, HLD, HTN, hypothyroidism, PVC who is here s/p fall admitted for further work up.    # Unwitnessed fall    - EKG with T wave inversion V1, V2  - CT pelvis: Bilateral hip osteoarthritis. No fracture.   -Chest X ray: No acute pathology  -Pelvis Xray: No fracture  - Left femur  Xray: No fracture    - Left knee Xray: Severe degenerative narrowing of the medial joint compartment. Moderate suprapatellar joint effusion. No fracture  -ECHO with EF 55-60%, severe aortic valve stenosis  -RVP neg  -UA neg  -Creatine kinase neg   - Bacitracin for scalp laceration  - Lidocaine patch, acetaminophen for pain     - Fall precautions    -Cardiology recommendations appreciated  -OT recommends [facility]  -PT recommends [facility]  -F/up orthostatic vitals    #Severe Aortic Valve Stenosis   -ECHO with EF 55-60%, severe aortic valve stenosis  -May be symptomatic in setting of frequent falls, near syncope  -Cardiology following  -Pt may need TAVR, cardio to discuss options with patient and HCP     # Mesenteric panniculitis   - per CT   - outpatient  f/u    # Irregular lobular contour of the uterus  - per CT  - outpatient f/u     # MAYA    -Improved  - Cr 1.42 ( baseline1.16 ) >>1.23  - BUN 29 >> 22  - monitor kidney function   -  hold ARB    # MHx CAD  # MHx HLD  # MHx HTN   - c/w amlodipine  - c/w statin   - c/w metoprolol     # MHx dementia with behavioural disturbance   - c/w quetiapine  - c/w donepezil  - c/w memantin    #MHx hypothyroidism  - c/w synthroid  -TSH wnl    #MHx GERD  - c/w PPI    # DVT ppx    - Lovenox    Dispo: PT/OT recommend [facility] once medically optimized     Case dw Dr. Dorman 87 y/o F with PMHx dementia with behavioural disturbance, CAD, HLD, HTN, hypothyroidism, PVC who is here s/p fall admitted for further work up.    # Unwitnessed fall    - EKG with T wave inversion V1, V2  - CT pelvis: Bilateral hip osteoarthritis. No fracture.   -Chest X ray: No acute pathology  -Pelvis Xray: No fracture  - Left femur  Xray: No fracture    - Left knee Xray: Severe degenerative narrowing of the medial joint compartment. Moderate suprapatellar joint effusion. No fracture  -ECHO with EF 55-60%, severe aortic valve stenosis  -RVP neg  -UA neg  -Creatine kinase neg   - Bacitracin for scalp laceration  - Lidocaine patch, acetaminophen for pain     - Fall precautions    -Cardiology recommendations appreciated  -OT recommends [facility]  -PT recommends [facility]    #Severe Aortic Valve Stenosis   -ECHO with EF 55-60%, severe aortic valve stenosis  -May be symptomatic in setting of frequent falls, near syncope  -Cardiology following   -Cardio discussed TAVR with patient and daughter to do inpatient vs outpatient TAVR, will pursue outpatient     # Mesenteric panniculitis   - per CT   - outpatient  f/u    # Irregular lobular contour of the uterus  - per CT  - outpatient f/u     # MAYA    -BUN 33, Cr 1.34  -Worsened since yesterday  -IVF  -Continue to monitor kidney function   -Hold ARB    # MHx CAD  # MHx HLD  # MHx HTN   - c/w amlodipine  - c/w statin   - c/w metoprolol     # MHx dementia with behavioural disturbance   - c/w quetiapine  - c/w donepezil  - c/w memantin    #MHx hypothyroidism  - c/w synthroid  -TSH wnl    #MHx GERD  - c/w PPI    # DVT ppx    - Lovenox    Dispo: PT/OT recommend [facility], pending auth     Case dw Dr. Arnold. 89 y/o F with PMHx dementia with behavioural disturbance, CAD, HLD, HTN, hypothyroidism, PVC who is here s/p fall admitted for further work up.    # Unwitnessed fall    - EKG with T wave inversion V1, V2  - CT pelvis: Bilateral hip osteoarthritis. No fracture.   -Chest X ray: No acute pathology  -Pelvis Xray: No fracture  - Left femur  Xray: No fracture    - Left knee Xray: Severe degenerative narrowing of the medial joint compartment. Moderate suprapatellar joint effusion. No fracture  -ECHO with EF 55-60%, severe aortic valve stenosis  -RVP neg  -UA neg  -Creatine kinase neg   - Bacitracin for scalp laceration  - Lidocaine patch, acetaminophen for pain     - Fall precautions    -Cardiology recommendations appreciated  -OT recommends [facility]  -PT recommends [facility]    #Severe Aortic Valve Stenosis   -ECHO with EF 55-60%, severe aortic valve stenosis  -May be symptomatic in setting of frequent falls, near syncope  -Cardiology following   -Cardio discussed TAVR with patient and daughter to do inpatient vs outpatient TAVR, will pursue outpatient     # Mesenteric panniculitis   - per CT   - outpatient  f/u    # Irregular lobular contour of the uterus  - per CT  - outpatient f/u     # MAYA    -BUN 33, Cr 1.34  -Worsened since yesterday  -IVF  -Continue to monitor kidney function   -Hold ARB    # MHx CAD  # MHx HLD  -- C/w statin     # MHx HTN   - c/w amlodipine  -Pressure and HR have low, will dc metoprolol succinate 25 for now    # MHx dementia with behavioural disturbance   - c/w quetiapine  - c/w donepezil  - c/w memantin    #MHx hypothyroidism  - c/w synthroid  -TSH wnl    #MHx GERD  - c/w PPI    # DVT ppx    - Lovenox    Dispo: PT/OT recommend [facility], pending auth     Case dw Dr. Arnold.

## 2024-04-18 NOTE — PHARMACOTHERAPY INTERVENTION NOTE - INTERVENTION TYPE RECOOMEND
Therapy Recommended - Drug indicated but not ordered
Therapy Discontinuation Recommended - No indication
Therapy Recommended - Drug indicated but not ordered
Therapy Discontinuation Recommended - No indication

## 2024-04-18 NOTE — PROGRESS NOTE ADULT - SUBJECTIVE AND OBJECTIVE BOX
89 y/o F with PMHx dementia with behavioural disturbance, CAD, HLD, HTN, hypothyroidism, PVC who is here s/p fall admitted for further work up.    Patient seen and examined at bedside. Patient states that the light in the room was bothering her. Pleasantly confused at times    REVIEW OF SYSTEMS:  CONSTITUTIONAL: (-) weakness, (-) fevers, (-) chills  EYES/ENT: (-) visual changes,  (-) vertigo,  (-) throat pain   NECK:  (-) pain, (-) stiffness  RESPIRATORY:  (-) shortness of breath, (-) cough,  (-) wheezing,  (-) hemoptysis   CARDIOVASCULAR:  (-) chest pain, (-) palpitations  GASTROINTESTINAL:  (-) abdominal or epigastric pain, (-) nausea, (-) vomiting, (-) diarrhea, (-) constipation, (-) melena,  (-) hematemesis,  (-) hematochezia  GENITOURINARY: (-) dysuria, (-) frequency, (-) hematuria  NEUROLOGICAL: (-) numbness, (-) weakness  SKIN: (-) itching, (-) rashes, (-) lesions    PHYSICAL EXAM:  Vital Signs Last 24 Hrs  T(C): 36.7 (18 Apr 2024 05:13), Max: 36.9 (17 Apr 2024 20:59)  T(F): 98.1 (18 Apr 2024 05:13), Max: 98.5 (17 Apr 2024 20:59)  HR: 57 (18 Apr 2024 05:13) (54 - 58)  BP: 105/65 (18 Apr 2024 05:13) (95/53 - 119/68)  RR: 16 (18 Apr 2024 05:13) (16 - 18)  SpO2: 96% (18 Apr 2024 05:13) (95% - 97%)    Parameters below as of 18 Apr 2024 05:13  Patient On (Oxygen Delivery Method): room air    PHYSICAL EXAM:  GENERAL: NAD, lying in bed comfortably  HEAD:  Atraumatic, Normocephalic  RESP: Clear to auscultation bilaterally, good air entry bilaterally; No wheezing, rales, or rhonchi. Unlabored respirations  CARDIAC: Harsh systolic murmur   GI:  Soft, Nontender, Nondistended. Bowel sounds present x4 quadrants; No hepatomegaly. No splenomegaly.  EXTREMITIES:  2+ Peripheral Pulses. Capillary refill <2 seconds. No clubbing, cyanosis, or edema  NERVOUS SYSTEM:  Alert & Oriented X3, however not oriented to situation, pleasantly confused at times  MSK: FROM all 4 extremities, full and equal strength  SKIN: No rashes, bruises, or other lesions    MEDICATIONS  (STANDING):  amLODIPine   Tablet 5 milliGRAM(s) Oral daily  atorvastatin 20 milliGRAM(s) Oral at bedtime  bacitracin   Ointment 1 Application(s) Topical two times a day  citalopram 10 milliGRAM(s) Oral daily  donepezil 5 milliGRAM(s) Oral at bedtime  enoxaparin Injectable 40 milliGRAM(s) SubCutaneous every 24 hours  folic acid 1 milliGRAM(s) Oral daily  levothyroxine 25 MICROGram(s) Oral daily  lidocaine   4% Patch 1 Patch Transdermal every 24 hours  losartan 100 milliGRAM(s) Oral daily  memantine 5 milliGRAM(s) Oral two times a day  metoprolol succinate ER 25 milliGRAM(s) Oral daily  pantoprazole    Tablet 40 milliGRAM(s) Oral before breakfast  QUEtiapine 25 milliGRAM(s) Oral two times a day    MEDICATIONS  (PRN):  acetaminophen     Tablet .. 975 milliGRAM(s) Oral every 6 hours PRN Moderate Pain (4 - 6)  acetaminophen     Tablet .. 650 milliGRAM(s) Oral every 6 hours PRN Temp greater or equal to 38C (100.4F), Mild Pain (1 - 3)  aluminum hydroxide/magnesium hydroxide/simethicone Suspension 30 milliLiter(s) Oral every 4 hours PRN Dyspepsia  melatonin 3 milliGRAM(s) Oral at bedtime PRN Insomnia  ondansetron Injectable 4 milliGRAM(s) IV Push every 8 hours PRN Nausea and/or Vomiting    LABS        CARDIAC MARKERS ( 16 Apr 2024 00:01 )  x     / x     / 127 U/L / x     / x                         11.5   6.87  )-----------( 193      ( 17 Apr 2024 05:57 )             34.4     17 Apr 2024 05:57    143    |  108    |  24     ----------------------------<  103    4.4     |  28     |  1.30     Ca    8.9        17 Apr 2024 05:57  Phos  3.2       16 Apr 2024 00:01  Mg     1.5       16 Apr 2024 00:01    TPro  6.4    /  Alb  2.9    /  TBili  0.7    /  DBili  x      /  AST  16     /  ALT  13     /  AlkPhos  103    17 Apr 2024 05:57    PT/INR - ( 15 Apr 2024 21:30 )   PT: 12.2 sec;   INR: 1.04 ratio      PTT - ( 15 Apr 2024 21:30 )  PTT:31.2 sec  CAPILLARY BLOOD GLUCOSE    LIVER FUNCTIONS - ( 17 Apr 2024 05:57 )  Alb: 2.9 g/dL / Pro: 6.4 g/dL / ALK PHOS: 103 U/L / ALT: 13 U/L / AST: 16 U/L / GGT: x           Urinalysis Basic - ( 17 Apr 2024 05:57 )    Color: x / Appearance: x / SG: x / pH: x  Gluc: 103 mg/dL / Ketone: x  / Bili: x / Urobili: x   Blood: x / Protein: x / Nitrite: x   Leuk Esterase: x / RBC: x / WBC x   Sq Epi: x / Non Sq Epi: x / Bacteria: x      CT HEAD  IMPRESSION:    Stable appearance of the brain.    No acute intracranial abnormalities.    Small vessel and atrophic changes.    Moderately advanced degenerative changes throughout cervical spine    No vertebral fracture, collapse or subluxation.    --- End of Report ---    WALDO CRUZ MD; Attending Radiologist  This document has been electronically signed. Apr 16 2024  2:50AM                             89 y/o F with PMHx dementia with behavioural disturbance, CAD, HLD, HTN, hypothyroidism, PVC who is here s/p fall admitted for further work up.    Patient seen and examined at bedside. Patient states that she is comfortable, no complaints. Pleasantly confused at times    REVIEW OF SYSTEMS:  CONSTITUTIONAL: (-) weakness, (-) fevers, (-) chills  EYES/ENT: (-) visual changes,  (-) vertigo,  (-) throat pain   NECK:  (-) pain, (-) stiffness  RESPIRATORY:  (-) shortness of breath, (-) cough,  (-) wheezing,  (-) hemoptysis   CARDIOVASCULAR:  (-) chest pain, (-) palpitations  GASTROINTESTINAL:  (-) abdominal or epigastric pain, (-) nausea, (-) vomiting, (-) diarrhea, (-) constipation, (-) melena,  (-) hematemesis,  (-) hematochezia  GENITOURINARY: (-) dysuria, (-) frequency, (-) hematuria  NEUROLOGICAL: (-) numbness, (-) weakness  SKIN: (-) itching, (-) rashes, (-) lesions    PHYSICAL EXAM:  Vital Signs Last 24 Hrs  T(C): 36.7 (18 Apr 2024 05:13), Max: 36.9 (17 Apr 2024 20:59)  T(F): 98.1 (18 Apr 2024 05:13), Max: 98.5 (17 Apr 2024 20:59)  HR: 57 (18 Apr 2024 05:13) (54 - 58)  BP: 105/65 (18 Apr 2024 05:13) (95/53 - 119/68)  RR: 16 (18 Apr 2024 05:13) (16 - 18)  SpO2: 96% (18 Apr 2024 05:13) (95% - 97%)    Parameters below as of 18 Apr 2024 05:13  Patient On (Oxygen Delivery Method): room air    PHYSICAL EXAM:  GENERAL: NAD, lying in bed comfortably  HEAD:  Atraumatic, Normocephalic  RESP: Clear to auscultation bilaterally, good air entry bilaterally; No wheezing, rales, or rhonchi. Unlabored respirations  CARDIAC: Harsh systolic murmur   GI:  Soft, Nontender, Nondistended. Bowel sounds present x4 quadrants; No hepatomegaly. No splenomegaly.  EXTREMITIES:  2+ Peripheral Pulses. Capillary refill <2 seconds. No clubbing, cyanosis, or edema  NERVOUS SYSTEM:  Alert & Oriented X2, not oriented to situation, pleasantly confused at times  MSK: FROM all 4 extremities, full and equal strength  SKIN: No rashes, bruises, or other lesions    MEDICATIONS  (STANDING):  amLODIPine   Tablet 5 milliGRAM(s) Oral daily  atorvastatin 20 milliGRAM(s) Oral at bedtime  bacitracin   Ointment 1 Application(s) Topical two times a day  citalopram 10 milliGRAM(s) Oral daily  donepezil 5 milliGRAM(s) Oral at bedtime  enoxaparin Injectable 40 milliGRAM(s) SubCutaneous every 24 hours  folic acid 1 milliGRAM(s) Oral daily  gabapentin 300 milliGRAM(s) Oral two times a day  levothyroxine 25 MICROGram(s) Oral daily  lidocaine   4% Patch 1 Patch Transdermal every 24 hours  losartan 100 milliGRAM(s) Oral daily  memantine 5 milliGRAM(s) Oral two times a day  metoprolol succinate ER 25 milliGRAM(s) Oral daily  pantoprazole    Tablet 40 milliGRAM(s) Oral before breakfast  QUEtiapine 25 milliGRAM(s) Oral two times a day    MEDICATIONS  (PRN):  acetaminophen     Tablet .. 650 milliGRAM(s) Oral every 6 hours PRN Temp greater or equal to 38C (100.4F), Mild Pain (1 - 3)  acetaminophen     Tablet .. 975 milliGRAM(s) Oral every 6 hours PRN Moderate Pain (4 - 6)  aluminum hydroxide/magnesium hydroxide/simethicone Suspension 30 milliLiter(s) Oral every 4 hours PRN Dyspepsia  melatonin 3 milliGRAM(s) Oral at bedtime PRN Insomnia  ondansetron Injectable 4 milliGRAM(s) IV Push every 8 hours PRN Nausea and/or Vomiting      LABS                          12.7   9.64  )-----------( 192      ( 18 Apr 2024 06:15 )             38.1     18 Apr 2024 06:15    140    |  106    |  33     ----------------------------<  83     4.7     |  26     |  1.34     Ca    8.9        18 Apr 2024 06:15    TPro  6.4    /  Alb  2.9    /  TBili  0.7    /  DBili  x      /  AST  16     /  ALT  13     /  AlkPhos  103    17 Apr 2024 05:57    CAPILLARY BLOOD GLUCOSE    LIVER FUNCTIONS - ( 17 Apr 2024 05:57 )  Alb: 2.9 g/dL / Pro: 6.4 g/dL / ALK PHOS: 103 U/L / ALT: 13 U/L / AST: 16 U/L / GGT: x           Urinalysis Basic - ( 18 Apr 2024 06:15 )    Color: x / Appearance: x / SG: x / pH: x  Gluc: 83 mg/dL / Ketone: x  / Bili: x / Urobili: x   Blood: x / Protein: x / Nitrite: x   Leuk Esterase: x / RBC: x / WBC x   Sq Epi: x / Non Sq Epi: x / Bacteria: x      CT HEAD  IMPRESSION:    Stable appearance of the brain.    No acute intracranial abnormalities.    Small vessel and atrophic changes.    Moderately advanced degenerative changes throughout cervical spine    No vertebral fracture, collapse or subluxation.    --- End of Report ---    WALDO CRUZ MD; Attending Radiologist  This document has been electronically signed. Apr 16 2024  2:50AM                             87 y/o F with PMHx dementia with behavioural disturbance, CAD, HLD, HTN, hypothyroidism, PVC who is here s/p fall admitted for further work up.    Patient seen and examined at bedside. Patient states that she is comfortable, no complaints. Pleasantly confused at times    REVIEW OF SYSTEMS:  CONSTITUTIONAL: (-) weakness, (-) fevers, (-) chills  EYES/ENT: (-) visual changes,  (-) vertigo,  (-) throat pain   NECK:  (-) pain, (-) stiffness  RESPIRATORY:  (-) shortness of breath, (-) cough,  (-) wheezing,  (-) hemoptysis   CARDIOVASCULAR:  (-) chest pain, (-) palpitations  GASTROINTESTINAL:  (-) abdominal or epigastric pain, (-) nausea, (-) vomiting, (-) diarrhea, (-) constipation, (-) melena,  (-) hematemesis,  (-) hematochezia  GENITOURINARY: (-) dysuria, (-) frequency, (-) hematuria  NEUROLOGICAL: (-) numbness, (-) weakness  SKIN: (-) itching, (-) rashes, (-) lesions    PHYSICAL EXAM:  Vital Signs Last 24 Hrs  T(C): 36.7 (18 Apr 2024 05:13), Max: 36.9 (17 Apr 2024 20:59)  T(F): 98.1 (18 Apr 2024 05:13), Max: 98.5 (17 Apr 2024 20:59)  HR: 57 (18 Apr 2024 05:13) (54 - 58)  BP: 105/65 (18 Apr 2024 05:13) (95/53 - 119/68)  RR: 16 (18 Apr 2024 05:13) (16 - 18)  SpO2: 96% (18 Apr 2024 05:13) (95% - 97%)    Parameters below as of 18 Apr 2024 05:13  Patient On (Oxygen Delivery Method): room air    PHYSICAL EXAM:  GENERAL: NAD, lying in bed comfortably  HEAD:  Atraumatic, Normocephalic  RESP: Clear to auscultation bilaterally, good air entry bilaterally; No wheezing, rales, or rhonchi. Unlabored respirations  CARDIAC: Harsh systolic murmur   GI:  Soft, Nontender, Nondistended. Bowel sounds present x4 quadrants; No hepatomegaly. No splenomegaly.  EXTREMITIES:  2+ Peripheral Pulses. Capillary refill <2 seconds. No clubbing, cyanosis, or edema  NERVOUS SYSTEM:  Alert & Oriented X2, not oriented to situation, pleasantly confused at times  MSK: FROM all 4 extremities, full and equal strength  SKIN: No rashes, bruises, or other lesions    MEDICATIONS  (STANDING):  amLODIPine   Tablet 5 milliGRAM(s) Oral daily  atorvastatin 20 milliGRAM(s) Oral at bedtime  bacitracin   Ointment 1 Application(s) Topical two times a day  citalopram 10 milliGRAM(s) Oral daily  donepezil 5 milliGRAM(s) Oral at bedtime  enoxaparin Injectable 40 milliGRAM(s) SubCutaneous every 24 hours  folic acid 1 milliGRAM(s) Oral daily  gabapentin 300 milliGRAM(s) Oral two times a day  levothyroxine 25 MICROGram(s) Oral daily  lidocaine   4% Patch 1 Patch Transdermal every 24 hours  losartan 100 milliGRAM(s) Oral daily  memantine 5 milliGRAM(s) Oral two times a day  pantoprazole    Tablet 40 milliGRAM(s) Oral before breakfast  QUEtiapine 25 milliGRAM(s) Oral two times a day    MEDICATIONS  (PRN):  acetaminophen     Tablet .. 650 milliGRAM(s) Oral every 6 hours PRN Temp greater or equal to 38C (100.4F), Mild Pain (1 - 3)  acetaminophen     Tablet .. 975 milliGRAM(s) Oral every 6 hours PRN Moderate Pain (4 - 6)  aluminum hydroxide/magnesium hydroxide/simethicone Suspension 30 milliLiter(s) Oral every 4 hours PRN Dyspepsia  melatonin 3 milliGRAM(s) Oral at bedtime PRN Insomnia  ondansetron Injectable 4 milliGRAM(s) IV Push every 8 hours PRN Nausea and/or Vomiting      LABS                          12.7   9.64  )-----------( 192      ( 18 Apr 2024 06:15 )             38.1     18 Apr 2024 06:15    140    |  106    |  33     ----------------------------<  83     4.7     |  26     |  1.34     Ca    8.9        18 Apr 2024 06:15    TPro  6.4    /  Alb  2.9    /  TBili  0.7    /  DBili  x      /  AST  16     /  ALT  13     /  AlkPhos  103    17 Apr 2024 05:57    CAPILLARY BLOOD GLUCOSE    LIVER FUNCTIONS - ( 17 Apr 2024 05:57 )  Alb: 2.9 g/dL / Pro: 6.4 g/dL / ALK PHOS: 103 U/L / ALT: 13 U/L / AST: 16 U/L / GGT: x           Urinalysis Basic - ( 18 Apr 2024 06:15 )    Color: x / Appearance: x / SG: x / pH: x  Gluc: 83 mg/dL / Ketone: x  / Bili: x / Urobili: x   Blood: x / Protein: x / Nitrite: x   Leuk Esterase: x / RBC: x / WBC x   Sq Epi: x / Non Sq Epi: x / Bacteria: x      CT HEAD  IMPRESSION:    Stable appearance of the brain.    No acute intracranial abnormalities.    Small vessel and atrophic changes.    Moderately advanced degenerative changes throughout cervical spine    No vertebral fracture, collapse or subluxation.    --- End of Report ---    WALDO CRUZ MD; Attending Radiologist  This document has been electronically signed. Apr 16 2024  2:50AM

## 2024-04-19 LAB
ALBUMIN SERPL ELPH-MCNC: 2.8 G/DL — LOW (ref 3.3–5)
ALP SERPL-CCNC: 94 U/L — SIGNIFICANT CHANGE UP (ref 40–120)
ALT FLD-CCNC: 15 U/L — SIGNIFICANT CHANGE UP (ref 10–45)
ANION GAP SERPL CALC-SCNC: 5 MMOL/L — SIGNIFICANT CHANGE UP (ref 5–17)
AST SERPL-CCNC: 18 U/L — SIGNIFICANT CHANGE UP (ref 10–40)
BILIRUB SERPL-MCNC: 0.4 MG/DL — SIGNIFICANT CHANGE UP (ref 0.2–1.2)
BUN SERPL-MCNC: 32 MG/DL — HIGH (ref 7–23)
CALCIUM SERPL-MCNC: 8.4 MG/DL — SIGNIFICANT CHANGE UP (ref 8.4–10.5)
CHLORIDE SERPL-SCNC: 109 MMOL/L — HIGH (ref 96–108)
CO2 SERPL-SCNC: 29 MMOL/L — SIGNIFICANT CHANGE UP (ref 22–31)
CREAT SERPL-MCNC: 1.32 MG/DL — HIGH (ref 0.5–1.3)
EGFR: 40 ML/MIN/1.73M2 — LOW
GLUCOSE SERPL-MCNC: 96 MG/DL — SIGNIFICANT CHANGE UP (ref 70–99)
HCT VFR BLD CALC: 33.6 % — LOW (ref 34.5–45)
HGB BLD-MCNC: 11.4 G/DL — LOW (ref 11.5–15.5)
MCHC RBC-ENTMCNC: 32.6 PG — SIGNIFICANT CHANGE UP (ref 27–34)
MCHC RBC-ENTMCNC: 33.9 GM/DL — SIGNIFICANT CHANGE UP (ref 32–36)
MCV RBC AUTO: 96 FL — SIGNIFICANT CHANGE UP (ref 80–100)
NRBC # BLD: 0 /100 WBCS — SIGNIFICANT CHANGE UP (ref 0–0)
PLATELET # BLD AUTO: 200 K/UL — SIGNIFICANT CHANGE UP (ref 150–400)
POTASSIUM SERPL-MCNC: 4.3 MMOL/L — SIGNIFICANT CHANGE UP (ref 3.5–5.3)
POTASSIUM SERPL-SCNC: 4.3 MMOL/L — SIGNIFICANT CHANGE UP (ref 3.5–5.3)
PROT SERPL-MCNC: 6.3 G/DL — SIGNIFICANT CHANGE UP (ref 6–8.3)
RBC # BLD: 3.5 M/UL — LOW (ref 3.8–5.2)
RBC # FLD: 12.1 % — SIGNIFICANT CHANGE UP (ref 10.3–14.5)
SODIUM SERPL-SCNC: 143 MMOL/L — SIGNIFICANT CHANGE UP (ref 135–145)
WBC # BLD: 6.93 K/UL — SIGNIFICANT CHANGE UP (ref 3.8–10.5)
WBC # FLD AUTO: 6.93 K/UL — SIGNIFICANT CHANGE UP (ref 3.8–10.5)

## 2024-04-19 PROCEDURE — 99233 SBSQ HOSP IP/OBS HIGH 50: CPT | Mod: GC

## 2024-04-19 RX ADMIN — QUETIAPINE FUMARATE 25 MILLIGRAM(S): 200 TABLET, FILM COATED ORAL at 05:17

## 2024-04-19 RX ADMIN — Medication 975 MILLIGRAM(S): at 13:30

## 2024-04-19 RX ADMIN — LIDOCAINE 1 PATCH: 4 CREAM TOPICAL at 07:55

## 2024-04-19 RX ADMIN — GABAPENTIN 300 MILLIGRAM(S): 400 CAPSULE ORAL at 18:20

## 2024-04-19 RX ADMIN — Medication 1 MILLIGRAM(S): at 12:35

## 2024-04-19 RX ADMIN — DONEPEZIL HYDROCHLORIDE 5 MILLIGRAM(S): 10 TABLET, FILM COATED ORAL at 21:11

## 2024-04-19 RX ADMIN — ATORVASTATIN CALCIUM 20 MILLIGRAM(S): 80 TABLET, FILM COATED ORAL at 21:11

## 2024-04-19 RX ADMIN — ENOXAPARIN SODIUM 40 MILLIGRAM(S): 100 INJECTION SUBCUTANEOUS at 05:16

## 2024-04-19 RX ADMIN — QUETIAPINE FUMARATE 25 MILLIGRAM(S): 200 TABLET, FILM COATED ORAL at 18:20

## 2024-04-19 RX ADMIN — SODIUM CHLORIDE 75 MILLILITER(S): 9 INJECTION INTRAMUSCULAR; INTRAVENOUS; SUBCUTANEOUS at 21:11

## 2024-04-19 RX ADMIN — PANTOPRAZOLE SODIUM 40 MILLIGRAM(S): 20 TABLET, DELAYED RELEASE ORAL at 05:16

## 2024-04-19 RX ADMIN — Medication 975 MILLIGRAM(S): at 12:36

## 2024-04-19 RX ADMIN — MEMANTINE HYDROCHLORIDE 5 MILLIGRAM(S): 10 TABLET ORAL at 18:20

## 2024-04-19 RX ADMIN — LIDOCAINE 1 PATCH: 4 CREAM TOPICAL at 05:15

## 2024-04-19 RX ADMIN — Medication 1 APPLICATION(S): at 18:20

## 2024-04-19 RX ADMIN — Medication 1 APPLICATION(S): at 05:15

## 2024-04-19 RX ADMIN — MEMANTINE HYDROCHLORIDE 5 MILLIGRAM(S): 10 TABLET ORAL at 05:16

## 2024-04-19 RX ADMIN — CITALOPRAM 10 MILLIGRAM(S): 10 TABLET, FILM COATED ORAL at 12:35

## 2024-04-19 RX ADMIN — LIDOCAINE 1 PATCH: 4 CREAM TOPICAL at 18:00

## 2024-04-19 RX ADMIN — Medication 25 MICROGRAM(S): at 05:16

## 2024-04-19 RX ADMIN — GABAPENTIN 300 MILLIGRAM(S): 400 CAPSULE ORAL at 05:16

## 2024-04-19 NOTE — DIETITIAN INITIAL EVALUATION ADULT - PERTINENT MEDS FT
MEDICATIONS  (STANDING):  atorvastatin 20 milliGRAM(s) Oral at bedtime  bacitracin   Ointment 1 Application(s) Topical two times a day  citalopram 10 milliGRAM(s) Oral daily  donepezil 5 milliGRAM(s) Oral at bedtime  enoxaparin Injectable 40 milliGRAM(s) SubCutaneous every 24 hours  folic acid 1 milliGRAM(s) Oral daily  gabapentin 300 milliGRAM(s) Oral two times a day  levothyroxine 25 MICROGram(s) Oral daily  lidocaine   4% Patch 1 Patch Transdermal every 24 hours  memantine 5 milliGRAM(s) Oral two times a day  pantoprazole    Tablet 40 milliGRAM(s) Oral before breakfast  QUEtiapine 25 milliGRAM(s) Oral two times a day  sodium chloride 0.9%. 1000 milliLiter(s) (75 mL/Hr) IV Continuous <Continuous>    MEDICATIONS  (PRN):  acetaminophen     Tablet .. 975 milliGRAM(s) Oral every 6 hours PRN Moderate Pain (4 - 6)  acetaminophen     Tablet .. 650 milliGRAM(s) Oral every 6 hours PRN Temp greater or equal to 38C (100.4F), Mild Pain (1 - 3)  aluminum hydroxide/magnesium hydroxide/simethicone Suspension 30 milliLiter(s) Oral every 4 hours PRN Dyspepsia  melatonin 3 milliGRAM(s) Oral at bedtime PRN Insomnia  ondansetron Injectable 4 milliGRAM(s) IV Push every 8 hours PRN Nausea and/or Vomiting

## 2024-04-19 NOTE — PROGRESS NOTE ADULT - SUBJECTIVE AND OBJECTIVE BOX
89 y/o F with PMHx dementia with behavioural disturbance, CAD, HLD, HTN, hypothyroidism, PVC who is here s/p fall admitted for further work up.    Patient seen and examined at bedside. Patient states that she is comfortable, no complaints. Pleasantly confused at times    REVIEW OF SYSTEMS:  CONSTITUTIONAL: (-) weakness, (-) fevers, (-) chills  EYES/ENT: (-) visual changes,  (-) vertigo,  (-) throat pain   NECK:  (-) pain, (-) stiffness  RESPIRATORY:  (-) shortness of breath, (-) cough,  (-) wheezing,  (-) hemoptysis   CARDIOVASCULAR:  (-) chest pain, (-) palpitations  GASTROINTESTINAL:  (-) abdominal or epigastric pain, (-) nausea, (-) vomiting, (-) diarrhea, (-) constipation, (-) melena,  (-) hematemesis,  (-) hematochezia  GENITOURINARY: (-) dysuria, (-) frequency, (-) hematuria  NEUROLOGICAL: (-) numbness, (-) weakness  SKIN: (-) itching, (-) rashes, (-) lesions    PHYSICAL EXAM:  Vital Signs Last 24 Hrs  T(C): 36.8 (19 Apr 2024 05:00), Max: 36.8 (19 Apr 2024 05:00)  T(F): 98.3 (19 Apr 2024 05:00), Max: 98.3 (19 Apr 2024 05:00)  HR: 71 (19 Apr 2024 05:00) (58 - 71)  BP: 144/71 (19 Apr 2024 05:00) (70/43 - 144/71)  RR: 17 (19 Apr 2024 05:00) (16 - 17)  SpO2: 96% (19 Apr 2024 05:00) (96% - 98%)    Parameters below as of 19 Apr 2024 05:00  Patient On (Oxygen Delivery Method): room air    PHYSICAL EXAM:  GENERAL: NAD, lying in bed comfortably  HEAD:  Atraumatic, Normocephalic  RESP: Clear to auscultation bilaterally, good air entry bilaterally; No wheezing, rales, or rhonchi. Unlabored respirations  CARDIAC: Harsh systolic murmur   GI:  Soft, Nontender, Nondistended. Bowel sounds present x4 quadrants; No hepatomegaly. No splenomegaly.  EXTREMITIES:  2+ Peripheral Pulses. Capillary refill <2 seconds. No clubbing, cyanosis, or edema  NERVOUS SYSTEM:  Alert & Oriented X2, not oriented to situation, pleasantly confused at times  MSK: FROM all 4 extremities, full and equal strength  SKIN: No rashes, bruises, or other lesions    MEDICATIONS  (STANDING):  amLODIPine   Tablet 5 milliGRAM(s) Oral daily  atorvastatin 20 milliGRAM(s) Oral at bedtime  bacitracin   Ointment 1 Application(s) Topical two times a day  citalopram 10 milliGRAM(s) Oral daily  donepezil 5 milliGRAM(s) Oral at bedtime  enoxaparin Injectable 40 milliGRAM(s) SubCutaneous every 24 hours  folic acid 1 milliGRAM(s) Oral daily  gabapentin 300 milliGRAM(s) Oral two times a day  levothyroxine 25 MICROGram(s) Oral daily  lidocaine   4% Patch 1 Patch Transdermal every 24 hours  losartan 100 milliGRAM(s) Oral daily  memantine 5 milliGRAM(s) Oral two times a day  pantoprazole    Tablet 40 milliGRAM(s) Oral before breakfast  QUEtiapine 25 milliGRAM(s) Oral two times a day    MEDICATIONS  (PRN):  acetaminophen     Tablet .. 650 milliGRAM(s) Oral every 6 hours PRN Temp greater or equal to 38C (100.4F), Mild Pain (1 - 3)  acetaminophen     Tablet .. 975 milliGRAM(s) Oral every 6 hours PRN Moderate Pain (4 - 6)  aluminum hydroxide/magnesium hydroxide/simethicone Suspension 30 milliLiter(s) Oral every 4 hours PRN Dyspepsia  melatonin 3 milliGRAM(s) Oral at bedtime PRN Insomnia  ondansetron Injectable 4 milliGRAM(s) IV Push every 8 hours PRN Nausea and/or Vomiting      LABS                          12.7   9.64  )-----------( 192      ( 18 Apr 2024 06:15 )             38.1     18 Apr 2024 06:15    140    |  106    |  33     ----------------------------<  83     4.7     |  26     |  1.34     Ca    8.9        18 Apr 2024 06:15    TPro  6.4    /  Alb  2.9    /  TBili  0.7    /  DBili  x      /  AST  16     /  ALT  13     /  AlkPhos  103    17 Apr 2024 05:57    CAPILLARY BLOOD GLUCOSE    LIVER FUNCTIONS - ( 17 Apr 2024 05:57 )  Alb: 2.9 g/dL / Pro: 6.4 g/dL / ALK PHOS: 103 U/L / ALT: 13 U/L / AST: 16 U/L / GGT: x           Urinalysis Basic - ( 18 Apr 2024 06:15 )    Color: x / Appearance: x / SG: x / pH: x  Gluc: 83 mg/dL / Ketone: x  / Bili: x / Urobili: x   Blood: x / Protein: x / Nitrite: x   Leuk Esterase: x / RBC: x / WBC x   Sq Epi: x / Non Sq Epi: x / Bacteria: x      CT HEAD  IMPRESSION:    Stable appearance of the brain.    No acute intracranial abnormalities.    Small vessel and atrophic changes.    Moderately advanced degenerative changes throughout cervical spine    No vertebral fracture, collapse or subluxation.    --- End of Report ---    WALDO CRUZ MD; Attending Radiologist  This document has been electronically signed. Apr 16 2024  2:50AM                             87 y/o F with PMHx dementia with behavioural disturbance, CAD, HLD, HTN, hypothyroidism, PVC who is here s/p fall admitted for further work up.    Patient seen and examined at bedside. Patient states that she is comfortable, no complaints. Pleasantly confused at times    REVIEW OF SYSTEMS:  CONSTITUTIONAL: (-) weakness, (-) fevers, (-) chills  EYES/ENT: (-) visual changes,  (-) vertigo,  (-) throat pain   NECK:  (-) pain, (-) stiffness  RESPIRATORY:  (-) shortness of breath, (-) cough,  (-) wheezing,  (-) hemoptysis   CARDIOVASCULAR:  (-) chest pain, (-) palpitations  GASTROINTESTINAL:  (-) abdominal or epigastric pain, (-) nausea, (-) vomiting, (-) diarrhea, (-) constipation, (-) melena,  (-) hematemesis,  (-) hematochezia  GENITOURINARY: (-) dysuria, (-) frequency, (-) hematuria  NEUROLOGICAL: (-) numbness, (-) weakness  SKIN: (-) itching, (-) rashes, (-) lesions    PHYSICAL EXAM:  Vital Signs Last 24 Hrs  T(C): 36.8 (19 Apr 2024 05:00), Max: 36.8 (19 Apr 2024 05:00)  T(F): 98.3 (19 Apr 2024 05:00), Max: 98.3 (19 Apr 2024 05:00)  HR: 71 (19 Apr 2024 05:00) (58 - 71)  BP: 144/71 (19 Apr 2024 05:00) (70/43 - 144/71)  RR: 17 (19 Apr 2024 05:00) (16 - 17)  SpO2: 96% (19 Apr 2024 05:00) (96% - 98%)    Parameters below as of 19 Apr 2024 05:00  Patient On (Oxygen Delivery Method): room air    PHYSICAL EXAM:  GENERAL: NAD, lying in bed comfortably  HEAD:  Atraumatic, Normocephalic  RESP: Clear to auscultation bilaterally, good air entry bilaterally; No wheezing, rales, or rhonchi. Unlabored respirations  CARDIAC: Harsh systolic murmur   GI:  Soft, Nontender, Nondistended. Bowel sounds present x4 quadrants; No hepatomegaly. No splenomegaly.  EXTREMITIES:  2+ Peripheral Pulses. Capillary refill <2 seconds. No clubbing, cyanosis, or edema  NERVOUS SYSTEM:  Alert & Oriented X2, not oriented to situation, pleasantly confused at times  MSK: FROM all 4 extremities, full and equal strength  SKIN: No rashes, bruises, or other lesions    MEDICATIONS  (STANDING):  atorvastatin 20 milliGRAM(s) Oral at bedtime  bacitracin   Ointment 1 Application(s) Topical two times a day  citalopram 10 milliGRAM(s) Oral daily  donepezil 5 milliGRAM(s) Oral at bedtime  enoxaparin Injectable 40 milliGRAM(s) SubCutaneous every 24 hours  folic acid 1 milliGRAM(s) Oral daily  gabapentin 300 milliGRAM(s) Oral two times a day  levothyroxine 25 MICROGram(s) Oral daily  lidocaine   4% Patch 1 Patch Transdermal every 24 hours  memantine 5 milliGRAM(s) Oral two times a day  pantoprazole    Tablet 40 milliGRAM(s) Oral before breakfast  QUEtiapine 25 milliGRAM(s) Oral two times a day  sodium chloride 0.9%. 1000 milliLiter(s) (75 mL/Hr) IV Continuous <Continuous>    MEDICATIONS  (PRN):  acetaminophen     Tablet .. 650 milliGRAM(s) Oral every 6 hours PRN Temp greater or equal to 38C (100.4F), Mild Pain (1 - 3)  acetaminophen     Tablet .. 975 milliGRAM(s) Oral every 6 hours PRN Moderate Pain (4 - 6)  aluminum hydroxide/magnesium hydroxide/simethicone Suspension 30 milliLiter(s) Oral every 4 hours PRN Dyspepsia  melatonin 3 milliGRAM(s) Oral at bedtime PRN Insomnia  ondansetron Injectable 4 milliGRAM(s) IV Push every 8 hours PRN Nausea and/or Vomiting      LABS                                  11.4   6.93  )-----------( 200      ( 19 Apr 2024 06:14 )             33.6     19 Apr 2024 06:14    143    |  109    |  32     ----------------------------<  96     4.3     |  29     |  1.32     Ca    8.4        19 Apr 2024 06:14    TPro  6.3    /  Alb  2.8    /  TBili  0.4    /  DBili  x      /  AST  18     /  ALT  15     /  AlkPhos  94     19 Apr 2024 06:14      CAPILLARY BLOOD GLUCOSE    LIVER FUNCTIONS - ( 19 Apr 2024 06:14 )  Alb: 2.8 g/dL / Pro: 6.3 g/dL / ALK PHOS: 94 U/L / ALT: 15 U/L / AST: 18 U/L / GGT: x           Urinalysis Basic - ( 19 Apr 2024 06:14 )    Color: x / Appearance: x / SG: x / pH: x  Gluc: 96 mg/dL / Ketone: x  / Bili: x / Urobili: x   Blood: x / Protein: x / Nitrite: x   Leuk Esterase: x / RBC: x / WBC x   Sq Epi: x / Non Sq Epi: x / Bacteria: x      CT HEAD  IMPRESSION:    Stable appearance of the brain.    No acute intracranial abnormalities.    Small vessel and atrophic changes.    Moderately advanced degenerative changes throughout cervical spine    No vertebral fracture, collapse or subluxation.    --- End of Report ---    WALDO CRUZ MD; Attending Radiologist  This document has been electronically signed. Apr 16 2024  2:50AM

## 2024-04-19 NOTE — DIETITIAN INITIAL EVALUATION ADULT - ORAL INTAKE PTA/DIET HISTORY
Pt pleasantly confused, lives with daughter who prepares meals at home. NKFA. Able to feed herself.

## 2024-04-19 NOTE — PROGRESS NOTE ADULT - ASSESSMENT
89 y/o F with PMHx dementia with behavioural disturbance, CAD, HLD, HTN, hypothyroidism, PVC who is here s/p fall admitted for further work up.    # Unwitnessed fall    - EKG with T wave inversion V1, V2  - CT pelvis: Bilateral hip osteoarthritis. No fracture.   -Chest X ray: No acute pathology  -Pelvis Xray: No fracture  - Left femur  Xray: No fracture    - Left knee Xray: Severe degenerative narrowing of the medial joint compartment. Moderate suprapatellar joint effusion. No fracture  -ECHO with EF 55-60%, severe aortic valve stenosis  -RVP neg  -UA neg  -Creatine kinase neg   - Bacitracin for scalp laceration  - Lidocaine patch, acetaminophen for pain     - Fall precautions    -Cardiology recommendations appreciated  -OT recommends [facility]  -PT recommends [facility]    #Severe Aortic Valve Stenosis   -ECHO with EF 55-60%, severe aortic valve stenosis  -May be symptomatic in setting of frequent falls, near syncope  -Cardiology following   -Cardio discussed TAVR with patient and daughter to do inpatient vs outpatient TAVR, will pursue outpatient     # Mesenteric panniculitis   - per CT   - outpatient  f/u    # Irregular lobular contour of the uterus  - per CT  - outpatient f/u     # MAYA    -BUN 33, Cr 1.34  -Worsened since yesterday  -IVF  -Continue to monitor kidney function   -Hold ARB    # MHx CAD  # MHx HLD  -- C/w statin     # MHx HTN   - c/w amlodipine  -Pressure and HR have low, will dc metoprolol succinate 25 for now    # MHx dementia with behavioural disturbance   - c/w quetiapine  - c/w donepezil  - c/w memantin    #MHx hypothyroidism  - c/w synthroid  -TSH wnl    #MHx GERD  - c/w PPI    # DVT ppx    - Lovenox    Dispo: PT/OT recommend [facility], pending auth     Case dw Dr. Arnold. 89 y/o F with PMHx dementia with behavioural disturbance, CAD, HLD, HTN, hypothyroidism, PVC who is here s/p fall admitted for further work up.    # Unwitnessed fall    - EKG with T wave inversion V1, V2  - CT pelvis: Bilateral hip osteoarthritis. No fracture.   -Chest X ray: No acute pathology  -Pelvis Xray: No fracture  - Left femur  Xray: No fracture    - Left knee Xray: Severe degenerative narrowing of the medial joint compartment. Moderate suprapatellar joint effusion. No fracture  -ECHO with EF 55-60%, severe aortic valve stenosis  -RVP neg  -UA neg  -Creatine kinase neg   - Bacitracin for scalp laceration  - Lidocaine patch, acetaminophen for pain     - Fall precautions    -Cardiology recommendations appreciated, pt to follow up outpatient for severe aortic valve stenosis  -OT recommends [facility]  -PT recommends [facility]    #Severe Aortic Valve Stenosis   -ECHO with EF 55-60%, severe aortic valve stenosis  -May be symptomatic in setting of frequent falls, near syncope  -Cardiology following   -Cardio discussed TAVR with patient and daughter to do inpatient vs outpatient TAVR, will pursue outpatient     # Mesenteric panniculitis   - per CT   - outpatient  f/u    # Irregular lobular contour of the uterus  - per CT  - outpatient f/u     # MAYA    -BUN 33, Cr 1.34  -Worsened since yesterday  -IVF  -Continue to monitor kidney function   -Hold ARB    # MHx CAD  # MHx HLD  -- C/w statin     # MHx HTN   - c/w amlodipine  -Pressure and HR have low, will dc metoprolol succinate 25 for now    # MHx dementia with behavioural disturbance   - c/w quetiapine  - c/w donepezil  - c/w memantin    #MHx hypothyroidism  - c/w synthroid  -TSH wnl    #MHx GERD  - c/w PPI    # DVT ppx    - Lovenox    Dispo: PT/OT recommend [facility], pending auth     Case dw Dr. Arnold.

## 2024-04-19 NOTE — DIETITIAN INITIAL EVALUATION ADULT - OTHER INFO
89 y/o F with PMHx dementia with behavioural disturbance, CAD, HLD, HTN, hypothyroidism, PVC who is here s/p fall admitted for further work up.    Pt tolerating diet, consuming mostly % of meals. Pt unable to recall UBW, guessed ~185lbs. .9lbs. Observed with mild muscle wasting/fat loss, but likely age-related losses. No pressure ulcers or edema. Pt denies N/V/D, constipation. Last BM 4/18. Continue plan of care. Encouraged po hydration.

## 2024-04-19 NOTE — DIETITIAN INITIAL EVALUATION ADULT - PERTINENT LABORATORY DATA
04-19    143  |  109<H>  |  32<H>  ----------------------------<  96  4.3   |  29  |  1.32<H>    Ca    8.4      19 Apr 2024 06:14    TPro  6.3  /  Alb  2.8<L>  /  TBili  0.4  /  DBili  x   /  AST  18  /  ALT  15  /  AlkPhos  94  04-19

## 2024-04-20 ENCOUNTER — TRANSCRIPTION ENCOUNTER (OUTPATIENT)
Age: 89
End: 2024-04-20

## 2024-04-20 LAB
ANION GAP SERPL CALC-SCNC: 7 MMOL/L — SIGNIFICANT CHANGE UP (ref 5–17)
BUN SERPL-MCNC: 23 MG/DL — SIGNIFICANT CHANGE UP (ref 7–23)
CALCIUM SERPL-MCNC: 8.2 MG/DL — LOW (ref 8.4–10.5)
CHLORIDE SERPL-SCNC: 109 MMOL/L — HIGH (ref 96–108)
CO2 SERPL-SCNC: 26 MMOL/L — SIGNIFICANT CHANGE UP (ref 22–31)
CREAT SERPL-MCNC: 0.89 MG/DL — SIGNIFICANT CHANGE UP (ref 0.5–1.3)
EGFR: 63 ML/MIN/1.73M2 — SIGNIFICANT CHANGE UP
GLUCOSE SERPL-MCNC: 81 MG/DL — SIGNIFICANT CHANGE UP (ref 70–99)
HCT VFR BLD CALC: 29.2 % — LOW (ref 34.5–45)
HGB BLD-MCNC: 9.8 G/DL — LOW (ref 11.5–15.5)
MCHC RBC-ENTMCNC: 32.2 PG — SIGNIFICANT CHANGE UP (ref 27–34)
MCHC RBC-ENTMCNC: 33.6 GM/DL — SIGNIFICANT CHANGE UP (ref 32–36)
MCV RBC AUTO: 96.1 FL — SIGNIFICANT CHANGE UP (ref 80–100)
NRBC # BLD: 0 /100 WBCS — SIGNIFICANT CHANGE UP (ref 0–0)
PLATELET # BLD AUTO: 186 K/UL — SIGNIFICANT CHANGE UP (ref 150–400)
POTASSIUM SERPL-MCNC: 4.1 MMOL/L — SIGNIFICANT CHANGE UP (ref 3.5–5.3)
POTASSIUM SERPL-SCNC: 4.1 MMOL/L — SIGNIFICANT CHANGE UP (ref 3.5–5.3)
RBC # BLD: 3.04 M/UL — LOW (ref 3.8–5.2)
RBC # FLD: 12.1 % — SIGNIFICANT CHANGE UP (ref 10.3–14.5)
SODIUM SERPL-SCNC: 142 MMOL/L — SIGNIFICANT CHANGE UP (ref 135–145)
WBC # BLD: 5.58 K/UL — SIGNIFICANT CHANGE UP (ref 3.8–10.5)
WBC # FLD AUTO: 5.58 K/UL — SIGNIFICANT CHANGE UP (ref 3.8–10.5)

## 2024-04-20 PROCEDURE — 99232 SBSQ HOSP IP/OBS MODERATE 35: CPT | Mod: GC

## 2024-04-20 RX ORDER — METOPROLOL TARTRATE 50 MG
1 TABLET ORAL
Qty: 0 | Refills: 0 | DISCHARGE

## 2024-04-20 RX ORDER — AMLODIPINE BESYLATE 2.5 MG/1
1 TABLET ORAL
Refills: 0 | DISCHARGE

## 2024-04-20 RX ORDER — IRBESARTAN 75 MG/1
1 TABLET ORAL
Refills: 0 | DISCHARGE

## 2024-04-20 RX ADMIN — GABAPENTIN 300 MILLIGRAM(S): 400 CAPSULE ORAL at 07:27

## 2024-04-20 RX ADMIN — QUETIAPINE FUMARATE 25 MILLIGRAM(S): 200 TABLET, FILM COATED ORAL at 06:37

## 2024-04-20 RX ADMIN — QUETIAPINE FUMARATE 25 MILLIGRAM(S): 200 TABLET, FILM COATED ORAL at 17:40

## 2024-04-20 RX ADMIN — Medication 1 APPLICATION(S): at 17:36

## 2024-04-20 RX ADMIN — MEMANTINE HYDROCHLORIDE 5 MILLIGRAM(S): 10 TABLET ORAL at 17:36

## 2024-04-20 RX ADMIN — Medication 650 MILLIGRAM(S): at 11:04

## 2024-04-20 RX ADMIN — SODIUM CHLORIDE 75 MILLILITER(S): 9 INJECTION INTRAMUSCULAR; INTRAVENOUS; SUBCUTANEOUS at 18:57

## 2024-04-20 RX ADMIN — ENOXAPARIN SODIUM 40 MILLIGRAM(S): 100 INJECTION SUBCUTANEOUS at 06:38

## 2024-04-20 RX ADMIN — LIDOCAINE 1 PATCH: 4 CREAM TOPICAL at 06:38

## 2024-04-20 RX ADMIN — ATORVASTATIN CALCIUM 20 MILLIGRAM(S): 80 TABLET, FILM COATED ORAL at 21:28

## 2024-04-20 RX ADMIN — GABAPENTIN 300 MILLIGRAM(S): 400 CAPSULE ORAL at 17:36

## 2024-04-20 RX ADMIN — Medication 25 MICROGRAM(S): at 06:38

## 2024-04-20 RX ADMIN — LIDOCAINE 1 PATCH: 4 CREAM TOPICAL at 18:16

## 2024-04-20 RX ADMIN — PANTOPRAZOLE SODIUM 40 MILLIGRAM(S): 20 TABLET, DELAYED RELEASE ORAL at 06:38

## 2024-04-20 RX ADMIN — LIDOCAINE 1 PATCH: 4 CREAM TOPICAL at 08:09

## 2024-04-20 RX ADMIN — CITALOPRAM 10 MILLIGRAM(S): 10 TABLET, FILM COATED ORAL at 11:06

## 2024-04-20 RX ADMIN — Medication 1 APPLICATION(S): at 06:38

## 2024-04-20 RX ADMIN — DONEPEZIL HYDROCHLORIDE 5 MILLIGRAM(S): 10 TABLET, FILM COATED ORAL at 21:28

## 2024-04-20 RX ADMIN — Medication 650 MILLIGRAM(S): at 10:37

## 2024-04-20 RX ADMIN — MEMANTINE HYDROCHLORIDE 5 MILLIGRAM(S): 10 TABLET ORAL at 06:37

## 2024-04-20 RX ADMIN — Medication 1 MILLIGRAM(S): at 11:06

## 2024-04-20 RX ADMIN — SODIUM CHLORIDE 75 MILLILITER(S): 9 INJECTION INTRAMUSCULAR; INTRAVENOUS; SUBCUTANEOUS at 21:28

## 2024-04-20 NOTE — DISCHARGE NOTE PROVIDER - NSDCCAREPROVSEEN_GEN_ALL_CORE_FT
Negra, Melania Arnold, Chelsie Damian, Kristen Negra, Melania Arnold, Chelsie Damian, Johnnie Weinstein

## 2024-04-20 NOTE — DISCHARGE NOTE PROVIDER - NSDCMRMEDTOKEN_GEN_ALL_CORE_FT
amLODIPine 5 mg oral tablet: 1 tab(s) orally once a day  atorvastatin 20 mg oral tablet: 1 tab(s) orally once a day  citalopram 10 mg oral tablet: 1 tab(s) orally once a day  donepezil 5 mg oral tablet: 1 tab(s) orally once a day  folic acid 1 mg oral tablet: 1 tab(s) orally once a day  gabapentin 300 mg oral capsule: orally 2 times a day  irbesartan 300 mg oral tablet: 1 tab(s) orally once a day  levothyroxine 25 mcg (0.025 mg) oral tablet: 1 tab(s) orally once a day  Metoprolol Succinate ER 25 mg oral tablet, extended release: 1 tab(s) orally once a day  Namenda 5 mg oral tablet: 1 tab(s) orally 2 times a day  pantoprazole 40 mg oral delayed release tablet: 1 tab(s) orally once a day  Seroquel 25 mg oral tablet: 1 tab(s) orally 2 times a day   atorvastatin 20 mg oral tablet: 1 tab(s) orally once a day  citalopram 10 mg oral tablet: 1 tab(s) orally once a day  donepezil 5 mg oral tablet: 1 tab(s) orally once a day  folic acid 1 mg oral tablet: 1 tab(s) orally once a day  gabapentin 300 mg oral capsule: orally 2 times a day  levothyroxine 25 mcg (0.025 mg) oral tablet: 1 tab(s) orally once a day  Namenda 5 mg oral tablet: 1 tab(s) orally 2 times a day  pantoprazole 40 mg oral delayed release tablet: 1 tab(s) orally once a day  Seroquel 25 mg oral tablet: 1 tab(s) orally 2 times a day   atorvastatin 20 mg oral tablet: 1 tab(s) orally once a day  citalopram 10 mg oral tablet: 1 tab(s) orally once a day  donepezil 5 mg oral tablet: 1 tab(s) orally once a day  folic acid 1 mg oral tablet: 1 tab(s) orally once a day  gabapentin 300 mg oral capsule: orally 2 times a day  irbesartan 300 mg oral tablet: 300 milligram(s) orally once a day  levothyroxine 25 mcg (0.025 mg) oral tablet: 1 tab(s) orally once a day  Namenda 5 mg oral tablet: 1 tab(s) orally 2 times a day  pantoprazole 40 mg oral delayed release tablet: 1 tab(s) orally once a day  Seroquel 25 mg oral tablet: 1 tab(s) orally 2 times a day

## 2024-04-20 NOTE — DISCHARGE NOTE PROVIDER - HOSPITAL COURSE
Patient is an 89 y/o F with PMHx of dementia with behavioral disturbance, CAD, HLD, HTN, hypothyroidism, PVC who is here s/p fall.     In the ED patient was alert, oriented x2, per daughter, baseline. Pt reported tripping while walking stairs, unsure of head strike, unsure of how she landed. Endorsed left leg pain. Said she’d been feeling dizzy recently.     Denied chest pain, palpitation, fever, chills, upper respiratory symptoms, GI symptoms, urinary symptoms.       Per daughter who is the HCP, she was presenting after an unwitnessed fall. Pt was sitting on stair when she went back home. No changes in mental status after fall. Reported that she hadn’t had any fall incidents recently. Unaware of any recent discomfort that pt has. Says pt has been having b/l leg numbness, went to see PCP, says could be spine arthritis.      That patient was admitted for further work up of frequent falls.      EKG with T wave inversion V1, V2. CT pelvis showed: Bilateral hip osteoarthritis. No fracture. Chest X ray showed: No acute pathology. Pelvis Xray showed: no fracture. Left femur Xray showed: no fracture. Left knee Xray showed: Severe degenerative narrowing of the medial joint compartment. Moderate suprapatellar joint effusion. No fracture.     RVP and UA were both found to be negative. Creatine kinase was negative.     ECHO showed EF 55-60%, severe aortic valve stenosis.     The patient was seen and evaluated by cardiology and their recommendations were appreciated. The option of TAVR was discussed with the patient and family however they would like to pursue this outpatient.      The patient was also seen by physical therapy and they recommended subacute rehab.      The patient is now medically optimized for discharge to subacute rehab with outpatient follow up with her primary care doctor and cardiologist.      PHYSICAL EXAM:  Vital Signs Last 24 Hrs  T(C): 36.7 (20 Apr 2024 11:49), Max: 37.1 (20 Apr 2024 04:57)  T(F): 98.1 (20 Apr 2024 11:49), Max: 98.8 (20 Apr 2024 04:57)  HR: 65 (20 Apr 2024 11:49) (65 - 82)  BP: 108/62 (20 Apr 2024 11:49) (108/62 - 131/67)  BP(mean): 73 (19 Apr 2024 20:40) (73 - 73)  RR: 18 (20 Apr 2024 11:49) (16 - 18)  SpO2: 95% (20 Apr 2024 11:49) (93% - 97%)    Parameters below as of 20 Apr 2024 11:49  Patient On (Oxygen Delivery Method): room air    PHYSICAL EXAM:  GENERAL: NAD, lying in bed comfortably  HEAD:  Atraumatic, Normocephalic  RESP: Clear to auscultation bilaterally, good air entry bilaterally; No wheezing, rales, or rhonchi. Unlabored respirations  CARDIAC: Regular rate and rhythm. S1 and S2. No murmurs, rubs, or gallops  GI:  Soft, Nontender, Nondistended. Bowel sounds present x4 quadrants; No hepatomegaly. No splenomegaly.  EXTREMITIES:  2+ Peripheral Pulses. Capillary refill <2 seconds. No clubbing, cyanosis, or edema  NERVOUS SYSTEM:  Alert & Oriented X3, speech clear. No deficits   MSK: FROM all 4 extremities, full and equal strength  SKIN: No rashes, bruises, or other lesions Patient is an 89 y/o F with PMHx of dementia with behavioral disturbance, CAD, HLD, HTN, hypothyroidism, PVC who is here s/p fall.     In the ED patient was alert, oriented x2, per daughter, baseline. Pt reported tripping while walking stairs, unsure of head strike, unsure of how she landed. Endorsed left leg pain. Said she’d been feeling dizzy recently.     Denied chest pain, palpitation, fever, chills, upper respiratory symptoms, GI symptoms, urinary symptoms.       Per daughter who is the HCP, she was presenting after an unwitnessed fall. Pt was sitting on stair when she went back home. No changes in mental status after fall. Reported that she hadn’t had any fall incidents recently. Unaware of any recent discomfort that pt has. Says pt has been having b/l leg numbness, went to see PCP, says could be spine arthritis.      That patient was admitted for further work up of frequent falls.      EKG with T wave inversion V1, V2. CT pelvis showed: Bilateral hip osteoarthritis. No fracture. Chest X ray showed: No acute pathology. Pelvis Xray showed: no fracture. Left femur Xray showed: no fracture. Left knee Xray showed: Severe degenerative narrowing of the medial joint compartment. Moderate suprapatellar joint effusion. No fracture.     RVP and UA were both found to be negative. Creatine kinase was negative.     ECHO showed EF 55-60%, severe aortic valve stenosis.     The patient was seen and evaluated by cardiology and their recommendations were appreciated. The option of TAVR was discussed with the patient and family however they would like to pursue this outpatient.  Blood pressure was noted to be stable off home medications so amlodipine, toprol and ARB were discontinued.     The patient was also seen by physical therapy and they recommended subacute rehab.      The patient is now medically optimized for discharge to subacute rehab with outpatient follow up with her primary care doctor and cardiologist.      PHYSICAL EXAM:  Vital Signs Last 24 Hrs  T(C): 36.7 (20 Apr 2024 11:49), Max: 37.1 (20 Apr 2024 04:57)  T(F): 98.1 (20 Apr 2024 11:49), Max: 98.8 (20 Apr 2024 04:57)  HR: 65 (20 Apr 2024 11:49) (65 - 82)  BP: 108/62 (20 Apr 2024 11:49) (108/62 - 131/67)  BP(mean): 73 (19 Apr 2024 20:40) (73 - 73)  RR: 18 (20 Apr 2024 11:49) (16 - 18)  SpO2: 95% (20 Apr 2024 11:49) (93% - 97%)    Parameters below as of 20 Apr 2024 11:49  Patient On (Oxygen Delivery Method): room air    PHYSICAL EXAM:  GENERAL: NAD, lying in bed comfortably  HEAD:  Atraumatic, Normocephalic  RESP: Clear to auscultation bilaterally, good air entry bilaterally; No wheezing, rales, or rhonchi. Unlabored respirations  CARDIAC: Regular rate and rhythm. S1 and S2. No murmurs, rubs, or gallops  GI:  Soft, Nontender, Nondistended. Bowel sounds present x4 quadrants; No hepatomegaly. No splenomegaly.  EXTREMITIES:  2+ Peripheral Pulses. Capillary refill <2 seconds. No clubbing, cyanosis, or edema  NERVOUS SYSTEM:  Alert & Oriented X3, speech clear. No deficits   MSK: FROM all 4 extremities, full and equal strength  SKIN: No rashes, bruises, or other lesions Patient is an 87 y/o F with PMHx of dementia with behavioral disturbance, CAD, HLD, HTN, hypothyroidism, PVC who is here s/p fall.     In the ED patient was alert, oriented x2, per daughter, baseline. Pt reported tripping while walking stairs, unsure of head strike, unsure of how she landed. Endorsed left leg pain. Said she’d been feeling dizzy recently.     Denied chest pain, palpitation, fever, chills, upper respiratory symptoms, GI symptoms, urinary symptoms.       Per daughter who is the HCP, she was presenting after an unwitnessed fall. Pt was sitting on stair when she went back home. No changes in mental status after fall. Reported that she hadn’t had any fall incidents recently. Unaware of any recent discomfort that pt has. Says pt has been having b/l leg numbness, went to see PCP, says could be spine arthritis.      That patient was admitted for further work up of frequent falls.      EKG with T wave inversion V1, V2. CT pelvis showed: Bilateral hip osteoarthritis. No fracture. Chest X ray showed: No acute pathology. Pelvis Xray showed: no fracture. Left femur Xray showed: no fracture. Left knee Xray showed: Severe degenerative narrowing of the medial joint compartment. Moderate suprapatellar joint effusion. No fracture.     RVP and UA were both found to be negative. Creatine kinase was negative.     ECHO showed EF 55-60%, severe aortic valve stenosis.     The patient was seen and evaluated by cardiology and their recommendations were appreciated. The option of TAVR was discussed with the patient and family however they would like to pursue this outpatient.  Blood pressure was noted to be stable off home medications so amlodipine, toprol and ARB were discontinued.     The patient was also seen by physical therapy and they recommended subacute rehab.      The patient is now medically optimized for discharge to subacute rehab with outpatient follow uup with her primary care doctor and cardiologist.      PHYSICAL EXAM:  Vital Signs Last 24 Hrs  T(C): 36.7 (20 Apr 2024 11:49), Max: 37.1 (20 Apr 2024 04:57)  T(F): 98.1 (20 Apr 2024 11:49), Max: 98.8 (20 Apr 2024 04:57)  HR: 65 (20 Apr 2024 11:49) (65 - 82)  BP: 108/62 (20 Apr 2024 11:49) (108/62 - 131/67)  BP(mean): 73 (19 Apr 2024 20:40) (73 - 73)  RR: 18 (20 Apr 2024 11:49) (16 - 18)  SpO2: 95% (20 Apr 2024 11:49) (93% - 97%)    Parameters below as of 20 Apr 2024 11:49  Patient On (Oxygen Delivery Method): room air    PHYSICAL EXAM:  GENERAL: NAD, lying in bed comfortably  HEAD:  Atraumatic, Normocephalic  RESP: Clear to auscultation bilaterally, good air entry bilaterally; No wheezing, rales, or rhonchi. Unlabored respirations  CARDIAC: Regular rate and rhythm. S1 and S2. No murmurs, rubs, or gallops  GI:  Soft, Nontender, Nondistended. Bowel sounds present x4 quadrants; No hepatomegaly. No splenomegaly.  EXTREMITIES:  2+ Peripheral Pulses. Capillary refill <2 seconds. No clubbing, cyanosis, or edema  NERVOUS SYSTEM:  Alert & Oriented X3, speech clear. No deficits   MSK: FROM all 4 extremities, full and equal strength  SKIN: No rashes, bruises, or other lesions Patient is an 87 y/o F with PMHx of dementia with behavioral disturbance, CAD, HLD, HTN, hypothyroidism, PVC who is here s/p fall.     In the ED patient was alert, oriented x2, per daughter, baseline. Pt reported tripping while walking stairs, unsure of head strike, unsure of how she landed. Endorsed left leg pain. Said she’d been feeling dizzy recently.     Denied chest pain, palpitation, fever, chills, upper respiratory symptoms, GI symptoms, urinary symptoms.       Per daughter who is the HCP, she was presenting after an unwitnessed fall. Pt was sitting on stair when she went back home. No changes in mental status after fall. Reported that she hadn’t had any fall incidents recently. Unaware of any recent discomfort that pt has. Says pt has been having b/l leg numbness, went to see PCP, says could be spine arthritis.      That patient was admitted for further work up of frequent falls.      EKG with T wave inversion V1, V2. CT pelvis showed: Bilateral hip osteoarthritis. No fracture. Chest X ray showed: No acute pathology. Pelvis Xray showed: no fracture. Left femur Xray showed: no fracture. Left knee Xray showed: Severe degenerative narrowing of the medial joint compartment. Moderate suprapatellar joint effusion. No fracture.     RVP and UA were both found to be negative. Creatine kinase was negative.     ECHO showed EF 55-60%, severe aortic valve stenosis.     The patient was seen and evaluated by cardiology and their recommendations were appreciated. The option of TAVR was discussed with the patient and family however they would like to pursue this outpatient.  Blood pressure was noted to be stable off home medications so amlodipine, toprol and ARB were discontinued.     The patient was also seen by physical therapy and they recommended subacute rehab.      The patient is now medically optimized for discharge to subacute rehab with outpatient follow up with her primary care doctor and cardiologist. Notified physician at [facility].     PHYSICAL EXAM:  Vital Signs Last 24 Hrs  T(C): 36.7 (20 Apr 2024 11:49), Max: 37.1 (20 Apr 2024 04:57)  T(F): 98.1 (20 Apr 2024 11:49), Max: 98.8 (20 Apr 2024 04:57)  HR: 65 (20 Apr 2024 11:49) (65 - 82)  BP: 108/62 (20 Apr 2024 11:49) (108/62 - 131/67)  BP(mean): 73 (19 Apr 2024 20:40) (73 - 73)  RR: 18 (20 Apr 2024 11:49) (16 - 18)  SpO2: 95% (20 Apr 2024 11:49) (93% - 97%)    Parameters below as of 20 Apr 2024 11:49  Patient On (Oxygen Delivery Method): room air    PHYSICAL EXAM:  GENERAL: NAD, lying in bed comfortably  HEAD:  Atraumatic, Normocephalic  RESP: Clear to auscultation bilaterally, good air entry bilaterally; No wheezing, rales, or rhonchi. Unlabored respirations  CARDIAC: Regular rate and rhythm. S1 and S2. No murmurs, rubs, or gallops  GI:  Soft, Nontender, Nondistended. Bowel sounds present x4 quadrants; No hepatomegaly. No splenomegaly.  EXTREMITIES:  2+ Peripheral Pulses. Capillary refill <2 seconds. No clubbing, cyanosis, or edema  NERVOUS SYSTEM:  Alert & Oriented X3, speech clear. No deficits   MSK: FROM all 4 extremities, full and equal strength  SKIN: No rashes, bruises, or other lesions Patient is an 87 y/o F with PMHx of dementia with behavioral disturbance, CAD, HLD, HTN, hypothyroidism, PVC who is here s/p fall.     In the ED patient was alert, oriented x2, per daughter, baseline. Pt reported tripping while walking stairs, unsure of head strike, unsure of how she landed. Endorsed left leg pain. Said she’d been feeling dizzy recently.     Denied chest pain, palpitation, fever, chills, upper respiratory symptoms, GI symptoms, urinary symptoms.       Per daughter who is the HCP, she was presenting after an unwitnessed fall. Pt was sitting on stair when she went back home. No changes in mental status after fall. Reported that she hadn’t had any fall incidents recently. Unaware of any recent discomfort that pt has. Says pt has been having b/l leg numbness, went to see PCP, says could be spine arthritis.      That patient was admitted for further work up of frequent falls.      EKG with T wave inversion V1, V2. CT pelvis showed: Bilateral hip osteoarthritis. No fracture. Chest X ray showed: No acute pathology. Pelvis Xray showed: no fracture. Left femur Xray showed: no fracture. Left knee Xray showed: Severe degenerative narrowing of the medial joint compartment. Moderate suprapatellar joint effusion. No fracture.     RVP and UA were both found to be negative. Creatine kinase was negative.     ECHO showed EF 55-60%, severe aortic valve stenosis.     The patient was seen and evaluated by cardiology and their recommendations were appreciated. The option of TAVR was discussed with the patient and family however they would like to pursue this outpatient.  Blood pressure was noted to be stable off home medications so amlodipine, toprol and ARB were discontinued.     The patient was also seen by physical therapy and they recommended subacute rehab.      The patient is now medically optimized for discharge to subacute rehab with outpatient follow up with her primary care doctor and cardiologist. Notified physician at [facility] physician Dr. Maria.     PHYSICAL EXAM:  Vital Signs Last 24 Hrs  T(C): 36.7 (20 Apr 2024 11:49), Max: 37.1 (20 Apr 2024 04:57)  T(F): 98.1 (20 Apr 2024 11:49), Max: 98.8 (20 Apr 2024 04:57)  HR: 65 (20 Apr 2024 11:49) (65 - 82)  BP: 108/62 (20 Apr 2024 11:49) (108/62 - 131/67)  BP(mean): 73 (19 Apr 2024 20:40) (73 - 73)  RR: 18 (20 Apr 2024 11:49) (16 - 18)  SpO2: 95% (20 Apr 2024 11:49) (93% - 97%)    Parameters below as of 20 Apr 2024 11:49  Patient On (Oxygen Delivery Method): room air    PHYSICAL EXAM:  GENERAL: NAD, lying in bed comfortably  HEAD:  Atraumatic, Normocephalic  RESP: Clear to auscultation bilaterally, good air entry bilaterally; No wheezing, rales, or rhonchi. Unlabored respirations  CARDIAC: Regular rate and rhythm. S1 and S2. No murmurs, rubs, or gallops  GI:  Soft, Nontender, Nondistended. Bowel sounds present x4 quadrants; No hepatomegaly. No splenomegaly.  EXTREMITIES:  2+ Peripheral Pulses. Capillary refill <2 seconds. No clubbing, cyanosis, or edema  NERVOUS SYSTEM:  Alert & Oriented X3, speech clear. No deficits   MSK: FROM all 4 extremities, full and equal strength  SKIN: No rashes, bruises, or other lesions

## 2024-04-20 NOTE — PROGRESS NOTE ADULT - SUBJECTIVE AND OBJECTIVE BOX
89 y/o F with PMHx dementia with behavioural disturbance, CAD, HLD, HTN, hypothyroidism, PVC who is here s/p fall admitted for further work up.    Patient seen and examined at bedside. Patient states that she is comfortable, no complaints. Pleasantly confused at times    REVIEW OF SYSTEMS:  CONSTITUTIONAL: (-) weakness, (-) fevers, (-) chills  EYES/ENT: (-) visual changes,  (-) vertigo,  (-) throat pain   NECK:  (-) pain, (-) stiffness  RESPIRATORY:  (-) shortness of breath, (-) cough,  (-) wheezing,  (-) hemoptysis   CARDIOVASCULAR:  (-) chest pain, (-) palpitations  GASTROINTESTINAL:  (-) abdominal or epigastric pain, (-) nausea, (-) vomiting, (-) diarrhea, (-) constipation, (-) melena,  (-) hematemesis,  (-) hematochezia  GENITOURINARY: (-) dysuria, (-) frequency, (-) hematuria  NEUROLOGICAL: (-) numbness, (-) weakness  SKIN: (-) itching, (-) rashes, (-) lesions    PHYSICAL EXAM:  Vital Signs Last 24 Hrs  T(C): 36.7 (20 Apr 2024 11:49), Max: 37.1 (20 Apr 2024 04:57)  T(F): 98.1 (20 Apr 2024 11:49), Max: 98.8 (20 Apr 2024 04:57)  HR: 65 (20 Apr 2024 11:49) (65 - 82)  BP: 108/62 (20 Apr 2024 11:49) (108/62 - 131/67)  BP(mean): 73 (19 Apr 2024 20:40) (73 - 73)  RR: 18 (20 Apr 2024 11:49) (16 - 18)  SpO2: 95% (20 Apr 2024 11:49) (93% - 97%)    Parameters below as of 20 Apr 2024 11:49  Patient On (Oxygen Delivery Method): room air    PHYSICAL EXAM:  GENERAL: NAD, lying in bed comfortably  HEAD:  Atraumatic, Normocephalic  RESP: Clear to auscultation bilaterally, good air entry bilaterally; No wheezing, rales, or rhonchi. Unlabored respirations  CARDIAC: Harsh systolic murmur   GI:  Soft, Nontender, Nondistended. Bowel sounds present x4 quadrants; No hepatomegaly. No splenomegaly.  EXTREMITIES:  2+ Peripheral Pulses. Capillary refill <2 seconds. No clubbing, cyanosis, or edema  NERVOUS SYSTEM:  Alert & Oriented X2, not oriented to situation, pleasantly confused at times  MSK: FROM all 4 extremities, full and equal strength  SKIN: No rashes, bruises, or other lesions    MEDICATIONS  (STANDING):  atorvastatin 20 milliGRAM(s) Oral at bedtime  bacitracin   Ointment 1 Application(s) Topical two times a day  citalopram 10 milliGRAM(s) Oral daily  donepezil 5 milliGRAM(s) Oral at bedtime  enoxaparin Injectable 40 milliGRAM(s) SubCutaneous every 24 hours  folic acid 1 milliGRAM(s) Oral daily  gabapentin 300 milliGRAM(s) Oral two times a day  levothyroxine 25 MICROGram(s) Oral daily  lidocaine   4% Patch 1 Patch Transdermal every 24 hours  memantine 5 milliGRAM(s) Oral two times a day  pantoprazole    Tablet 40 milliGRAM(s) Oral before breakfast  QUEtiapine 25 milliGRAM(s) Oral two times a day  sodium chloride 0.9%. 1000 milliLiter(s) (75 mL/Hr) IV Continuous <Continuous>    MEDICATIONS  (PRN):  acetaminophen     Tablet .. 650 milliGRAM(s) Oral every 6 hours PRN Temp greater or equal to 38C (100.4F), Mild Pain (1 - 3)  acetaminophen     Tablet .. 975 milliGRAM(s) Oral every 6 hours PRN Moderate Pain (4 - 6)  aluminum hydroxide/magnesium hydroxide/simethicone Suspension 30 milliLiter(s) Oral every 4 hours PRN Dyspepsia  melatonin 3 milliGRAM(s) Oral at bedtime PRN Insomnia  ondansetron Injectable 4 milliGRAM(s) IV Push every 8 hours PRN Nausea and/or Vomiting      LABS                                          9.8    5.58  )-----------( 186      ( 20 Apr 2024 06:11 )             29.2     20 Apr 2024 06:11    142    |  109    |  23     ----------------------------<  81     4.1     |  26     |  0.89     Ca    8.2        20 Apr 2024 06:11    TPro  6.3    /  Alb  2.8    /  TBili  0.4    /  DBili  x      /  AST  18     /  ALT  15     /  AlkPhos  94     19 Apr 2024 06:14      CAPILLARY BLOOD GLUCOSE    LIVER FUNCTIONS - ( 19 Apr 2024 06:14 )  Alb: 2.8 g/dL / Pro: 6.3 g/dL / ALK PHOS: 94 U/L / ALT: 15 U/L / AST: 18 U/L / GGT: x           Urinalysis Basic - ( 20 Apr 2024 06:11 )    Color: x / Appearance: x / SG: x / pH: x  Gluc: 81 mg/dL / Ketone: x  / Bili: x / Urobili: x   Blood: x / Protein: x / Nitrite: x   Leuk Esterase: x / RBC: x / WBC x   Sq Epi: x / Non Sq Epi: x / Bacteria: x      CT HEAD  IMPRESSION:    Stable appearance of the brain.    No acute intracranial abnormalities.    Small vessel and atrophic changes.    Moderately advanced degenerative changes throughout cervical spine    No vertebral fracture, collapse or subluxation.    --- End of Report ---    WALDO CRUZ MD; Attending Radiologist  This document has been electronically signed. Apr 16 2024  2:50AM

## 2024-04-20 NOTE — PROGRESS NOTE ADULT - ASSESSMENT
89 y/o F with PMHx dementia with behavioural disturbance, CAD, HLD, HTN, hypothyroidism, PVC who is here s/p fall admitted for further work up.    # Unwitnessed fall    - EKG with T wave inversion V1, V2  - CT pelvis: Bilateral hip osteoarthritis. No fracture.   -Chest X ray: No acute pathology  -Pelvis Xray: No fracture  - Left femur  Xray: No fracture    - Left knee Xray: Severe degenerative narrowing of the medial joint compartment. Moderate suprapatellar joint effusion. No fracture  -ECHO with EF 55-60%, severe aortic valve stenosis  -RVP neg  -UA neg  -Creatine kinase neg   - Bacitracin for scalp laceration  - Lidocaine patch, acetaminophen for pain     - Fall precautions    -Cardiology recommendations appreciated, pt to follow up outpatient for severe aortic valve stenosis  -OT recommends [facility]  -PT recommends [facility]    #Severe Aortic Valve Stenosis   -ECHO with EF 55-60%, severe aortic valve stenosis  -May be symptomatic in setting of frequent falls, near syncope  -Cardiology following   -Cardio discussed TAVR with patient and daughter to do inpatient vs outpatient TAVR, will pursue outpatient     # Mesenteric panniculitis   - per CT   - outpatient  f/u    # Irregular lobular contour of the uterus  - per CT  - outpatient f/u     # MAYA    -BUN 33, Cr 1.34  -Worsened since yesterday  -IVF  -Continue to monitor kidney function   -Hold ARB    # MHx CAD  # MHx HLD  -- C/w statin     # MHx HTN   - c/w amlodipine  -Pressure and HR have low, will dc metoprolol succinate 25 for now    # MHx dementia with behavioural disturbance   - c/w quetiapine  - c/w donepezil  - c/w memantin    #MHx hypothyroidism  - c/w synthroid  -TSH wnl    #MHx GERD  - c/w PPI    # DVT ppx    - Lovenox    Dispo: PT/OT recommend [facility], pending auth. Family to pursue severe aortic stenosis and possible TAVR outpatient.      Case dw Dr. Omer. 87 y/o F with PMHx dementia with behavioural disturbance, CAD, HLD, HTN, hypothyroidism, PVC who is here s/p fall admitted for further work up.    # Unwitnessed fall    - EKG with T wave inversion V1, V2  - CT pelvis: Bilateral hip osteoarthritis. No fracture.   -Chest X ray: No acute pathology  -Pelvis Xray: No fracture  - Left femur  Xray: No fracture    - Left knee Xray: Severe degenerative narrowing of the medial joint compartment. Moderate suprapatellar joint effusion. No fracture  -ECHO with EF 55-60%, severe aortic valve stenosis  -RVP neg  -UA neg  -Creatine kinase neg   - Bacitracin for scalp laceration  - Lidocaine patch, acetaminophen for pain     - Fall precautions    -Cardiology recommendations appreciated, pt to follow up outpatient for severe aortic valve stenosis  -OT recommends [facility]  -PT recommends [facility]    #Severe Aortic Valve Stenosis   -ECHO with EF 55-60%, severe aortic valve stenosis  -May be symptomatic in setting of frequent falls, near syncope  -Cardiology following   -Cardio discussed TAVR with patient and daughter to do inpatient vs outpatient TAVR, will pursue outpatient     # Mesenteric panniculitis   - per CT   - outpatient  f/u    # Irregular lobular contour of the uterus  - per CT  - outpatient f/u     # MAYA    -BUN 33, Cr 1.34 >> BUN 23, Cr 0.89  -Improving  -IVF  -Continue to monitor kidney function   -Hold ARB    # MHx CAD  # MHx HLD  -- C/w statin     # MHx HTN   - BPs have been low   -dc amlodipine for now  -dc metoprolol succinate 25 for now    # MHx dementia with behavioural disturbance   - c/w quetiapine  - c/w donepezil  - c/w memantin    #MHx hypothyroidism  - c/w synthroid  -TSH wnl    #MHx GERD  - c/w PPI    # DVT ppx    - Lovenox    Dispo: PT/OT recommend [facility], pending auth. Family to pursue severe aortic stenosis and possible TAVR outpatient.      Case dw Dr. Omer.

## 2024-04-20 NOTE — PROGRESS NOTE ADULT - TIME BILLING
This includes reviewing results/imaging and discussions with specialists, nursing, case management/social work. Further tests, medications, and procedures have been ordered as indicated. Results and the plan of care were communicated to the patient and/or their family member. Supporting documentation was completed and added to the patient's chart.
care coordination, plan of care discussed with patient face to face, 3E IDR team
care coordination, plan of care discussed with patient face to face, 3E IDR team, daughter/hcp, Dr Calle Cardio

## 2024-04-20 NOTE — DISCHARGE NOTE PROVIDER - NSDCCPCAREPLAN_GEN_ALL_CORE_FT
PRINCIPAL DISCHARGE DIAGNOSIS  Diagnosis: Frequent falls  Assessment and Plan of Treatment: You were admitted for frequent falls. Physical therapy saw you and recommended subacute rehab. You will be discharged to a subacute rehab faciltiy for further management.      SECONDARY DISCHARGE DIAGNOSES  Diagnosis: Severe aortic stenosis  Assessment and Plan of Treatment: Having aortic valve stenosis means that the valve between your heart and the large blood vessel that carries blood to the body (aorta) has narrowed. That forces the heart to pump harder to get enough blood through the valve. As stenosis gets worse, the valve gets narrower. This can cause symptoms. Symptoms include chest pain, dizziness, fainting, or shortness of breath.  Your doctor will check your heart regularly. You may take medicine to lower blood pressure or cholesterol. If the stenosis gets worse, you may choose to have the valve replaced.  Follow-up care is a key part of your treatment and safety. Be sure to make and go to all appointments, and call your doctor  if you are having problems. It's also a good idea to know your test results and keep a list of the medicines you take.  Please follow up these results with your primary care provider.    Diagnosis: Abnormal finding on CT scan  Assessment and Plan of Treatment: While you were admitted you had a CT scan done of your abdomen and pelvis.  The findings are as below. Which include a lobular contour of the uterus and mesenteric panniculitis. Please follow up these findings with your primary care doctor.   CT Abdomen and Pelvis:  FINDINGS:  Stomach and bowel: Colonic diverticulosis. No diverticulitis.  Reproductive: Irregular lobular contour of the uterus is probably result of  uterine fibroids.  Lymph nodes: Mildly increased attenuation of the mesentery near its root with pseudocapsule and small associated mesenteric lymph nodes, consistent with mesenteric panniculitis.  Bones/joints: Bilateral moderate hip osteoarthritis. No fracture.  Soft tissues: Unremarkable.  IMPRESSION:  1. No acute fracture or dislocation.  --- End of Report ---  LAURENCE HOANG MD; Attending Radiologist  This document has been electronically signed. Apr 16 2024 8:01AM

## 2024-04-21 LAB
ANION GAP SERPL CALC-SCNC: 6 MMOL/L — SIGNIFICANT CHANGE UP (ref 5–17)
BUN SERPL-MCNC: 18 MG/DL — SIGNIFICANT CHANGE UP (ref 7–23)
CALCIUM SERPL-MCNC: 8.6 MG/DL — SIGNIFICANT CHANGE UP (ref 8.4–10.5)
CHLORIDE SERPL-SCNC: 109 MMOL/L — HIGH (ref 96–108)
CO2 SERPL-SCNC: 27 MMOL/L — SIGNIFICANT CHANGE UP (ref 22–31)
CREAT SERPL-MCNC: 0.82 MG/DL — SIGNIFICANT CHANGE UP (ref 0.5–1.3)
EGFR: 70 ML/MIN/1.73M2 — SIGNIFICANT CHANGE UP
GLUCOSE SERPL-MCNC: 85 MG/DL — SIGNIFICANT CHANGE UP (ref 70–99)
HCT VFR BLD CALC: 30.8 % — LOW (ref 34.5–45)
HGB BLD-MCNC: 10.3 G/DL — LOW (ref 11.5–15.5)
MCHC RBC-ENTMCNC: 32.1 PG — SIGNIFICANT CHANGE UP (ref 27–34)
MCHC RBC-ENTMCNC: 33.4 GM/DL — SIGNIFICANT CHANGE UP (ref 32–36)
MCV RBC AUTO: 96 FL — SIGNIFICANT CHANGE UP (ref 80–100)
NRBC # BLD: 0 /100 WBCS — SIGNIFICANT CHANGE UP (ref 0–0)
PLATELET # BLD AUTO: 190 K/UL — SIGNIFICANT CHANGE UP (ref 150–400)
POTASSIUM SERPL-MCNC: 4.4 MMOL/L — SIGNIFICANT CHANGE UP (ref 3.5–5.3)
POTASSIUM SERPL-SCNC: 4.4 MMOL/L — SIGNIFICANT CHANGE UP (ref 3.5–5.3)
RBC # BLD: 3.21 M/UL — LOW (ref 3.8–5.2)
RBC # FLD: 12.1 % — SIGNIFICANT CHANGE UP (ref 10.3–14.5)
SODIUM SERPL-SCNC: 142 MMOL/L — SIGNIFICANT CHANGE UP (ref 135–145)
WBC # BLD: 5.77 K/UL — SIGNIFICANT CHANGE UP (ref 3.8–10.5)
WBC # FLD AUTO: 5.77 K/UL — SIGNIFICANT CHANGE UP (ref 3.8–10.5)

## 2024-04-21 PROCEDURE — 99232 SBSQ HOSP IP/OBS MODERATE 35: CPT | Mod: GC

## 2024-04-21 RX ADMIN — Medication 1 APPLICATION(S): at 06:14

## 2024-04-21 RX ADMIN — GABAPENTIN 300 MILLIGRAM(S): 400 CAPSULE ORAL at 17:51

## 2024-04-21 RX ADMIN — Medication 25 MICROGRAM(S): at 06:14

## 2024-04-21 RX ADMIN — LIDOCAINE 1 PATCH: 4 CREAM TOPICAL at 06:13

## 2024-04-21 RX ADMIN — PANTOPRAZOLE SODIUM 40 MILLIGRAM(S): 20 TABLET, DELAYED RELEASE ORAL at 06:15

## 2024-04-21 RX ADMIN — CITALOPRAM 10 MILLIGRAM(S): 10 TABLET, FILM COATED ORAL at 13:29

## 2024-04-21 RX ADMIN — GABAPENTIN 300 MILLIGRAM(S): 400 CAPSULE ORAL at 06:19

## 2024-04-21 RX ADMIN — Medication 1 MILLIGRAM(S): at 13:28

## 2024-04-21 RX ADMIN — ATORVASTATIN CALCIUM 20 MILLIGRAM(S): 80 TABLET, FILM COATED ORAL at 22:10

## 2024-04-21 RX ADMIN — ENOXAPARIN SODIUM 40 MILLIGRAM(S): 100 INJECTION SUBCUTANEOUS at 06:14

## 2024-04-21 RX ADMIN — SODIUM CHLORIDE 75 MILLILITER(S): 9 INJECTION INTRAMUSCULAR; INTRAVENOUS; SUBCUTANEOUS at 10:35

## 2024-04-21 RX ADMIN — Medication 975 MILLIGRAM(S): at 10:34

## 2024-04-21 RX ADMIN — Medication 1 APPLICATION(S): at 17:50

## 2024-04-21 RX ADMIN — Medication 650 MILLIGRAM(S): at 13:29

## 2024-04-21 RX ADMIN — QUETIAPINE FUMARATE 25 MILLIGRAM(S): 200 TABLET, FILM COATED ORAL at 06:15

## 2024-04-21 RX ADMIN — MEMANTINE HYDROCHLORIDE 5 MILLIGRAM(S): 10 TABLET ORAL at 17:51

## 2024-04-21 RX ADMIN — QUETIAPINE FUMARATE 25 MILLIGRAM(S): 200 TABLET, FILM COATED ORAL at 17:51

## 2024-04-21 RX ADMIN — DONEPEZIL HYDROCHLORIDE 5 MILLIGRAM(S): 10 TABLET, FILM COATED ORAL at 22:10

## 2024-04-21 RX ADMIN — MEMANTINE HYDROCHLORIDE 5 MILLIGRAM(S): 10 TABLET ORAL at 06:15

## 2024-04-21 NOTE — PROGRESS NOTE ADULT - ASSESSMENT
89 y/o F with PMHx dementia with behavioural disturbance, CAD, HLD, HTN, hypothyroidism, PVC who is here s/p fall admitted for further work up.    # Unwitnessed fall    - EKG with T wave inversion V1, V2  - CT pelvis: Bilateral hip osteoarthritis. No fracture.   -Chest X ray: No acute pathology  -Pelvis Xray: No fracture  - Left femur  Xray: No fracture    - Left knee Xray: Severe degenerative narrowing of the medial joint compartment. Moderate suprapatellar joint effusion. No fracture  -ECHO with EF 55-60%, severe aortic valve stenosis  -RVP neg  -UA neg  -Creatine kinase neg   - Bacitracin for scalp laceration  - Lidocaine patch, acetaminophen for pain     - Fall precautions    -Cardiology recommendations appreciated, pt to follow up outpatient for severe aortic valve stenosis  -OT recommends [facility]  -PT recommends [facility]    #Severe Aortic Valve Stenosis   -ECHO with EF 55-60%, severe aortic valve stenosis  -May be symptomatic in setting of frequent falls, near syncope  -Cardiology following   -Cardio discussed TAVR with patient and daughter to do inpatient vs outpatient TAVR, will pursue outpatient     # Mesenteric panniculitis   - per CT   - outpatient  f/u    # Irregular lobular contour of the uterus  - per CT  - outpatient f/u       # MHx CAD  # MHx HLD  -- C/w statin     # MHx HTN   - BPs have been low   -dc amlodipine for now, ARB was put on hold given initial renal fxn   -dc metoprolol succinate 25 for now  -monitor BP closely ion setting of severe AS    # MHx dementia with behavioural disturbance   - c/w quetiapine  - c/w donepezil  - c/w memantin    #MHx hypothyroidism  - c/w synthroid  -TSH wnl    #MHx GERD  - c/w PPI    # DVT ppx    - Lovenox    Dispo: PT/OT recommend [facility], pending auth. Family to pursue severe aortic stenosis and possible TAVR outpatient.      Case dw Dr. Arnold

## 2024-04-21 NOTE — PROGRESS NOTE ADULT - SUBJECTIVE AND OBJECTIVE BOX
89 y/o F with PMHx dementia with behavioural disturbance, CAD, HLD, HTN, hypothyroidism, PVC who is here s/p fall admitted for further work up.    Patient seen and examined at bedside, complains of L thigh pain, has been getting up to go to bathroom.     REVIEW OF SYSTEMS:  CONSTITUTIONAL: (-) weakness, (-) fevers, (-) chills  EYES/ENT: (-) visual changes,  (-) vertigo,  (-) throat pain   NECK:  (-) pain, (-) stiffness  RESPIRATORY:  (-) shortness of breath, (-) cough,  (-) wheezing,  (-) hemoptysis   CARDIOVASCULAR:  (-) chest pain, (-) palpitations  GASTROINTESTINAL:  (-) abdominal or epigastric pain, (-) nausea, (-) vomiting, (-) diarrhea, (-) constipation, (-) melena,  (-) hematemesis,  (-) hematochezia  GENITOURINARY: (-) dysuria, (-) frequency, (-) hematuria  NEUROLOGICAL: (-) numbness, (-) weakness  SKIN: (-) itching, (-) rashes, (-) lesions    PHYSICAL EXAM:  Vital Signs Last 24 Hrs  T(C): 37.1 (21 Apr 2024 05:05), Max: 37.1 (20 Apr 2024 20:35)  T(F): 98.7 (21 Apr 2024 05:05), Max: 98.7 (20 Apr 2024 20:35)  HR: 75 (21 Apr 2024 05:05) (65 - 75)  BP: 149/64 (21 Apr 2024 05:05) (108/62 - 149/64)  BP(mean): --  RR: 17 (21 Apr 2024 05:05) (17 - 18)  SpO2: 92% (21 Apr 2024 05:05) (92% - 95%)    Parameters below as of 21 Apr 2024 05:05  Patient On (Oxygen Delivery Method): room air        PHYSICAL EXAM:  GENERAL: NAD, lying in bed comfortably  HEAD:  Atraumatic, Normocephalic  RESP: Clear to auscultation bilaterally, good air entry bilaterally; No wheezing, rales, or rhonchi. Unlabored respirations  CARDIAC: Harsh systolic murmur   GI:  Soft, Nontender, Nondistended. Bowel sounds present x4 quadrants; No hepatomegaly. No splenomegaly.  EXTREMITIES:  2+ Peripheral Pulses. Capillary refill <2 seconds. No clubbing, cyanosis, or edema  NERVOUS SYSTEM:  Alert & Oriented X2, not oriented to situation, pleasantly confused at times  MSK: limited L knee extension, no tenderness to palpation.   SKIN: No rashes, bruises, or other lesions    MEDICATIONS  (STANDING):  atorvastatin 20 milliGRAM(s) Oral at bedtime  bacitracin   Ointment 1 Application(s) Topical two times a day  citalopram 10 milliGRAM(s) Oral daily  donepezil 5 milliGRAM(s) Oral at bedtime  enoxaparin Injectable 40 milliGRAM(s) SubCutaneous every 24 hours  folic acid 1 milliGRAM(s) Oral daily  gabapentin 300 milliGRAM(s) Oral two times a day  levothyroxine 25 MICROGram(s) Oral daily  lidocaine   4% Patch 1 Patch Transdermal every 24 hours  memantine 5 milliGRAM(s) Oral two times a day  pantoprazole    Tablet 40 milliGRAM(s) Oral before breakfast  QUEtiapine 25 milliGRAM(s) Oral two times a day  sodium chloride 0.9%. 1000 milliLiter(s) (75 mL/Hr) IV Continuous <Continuous>    MEDICATIONS  (PRN):  acetaminophen     Tablet .. 650 milliGRAM(s) Oral every 6 hours PRN Temp greater or equal to 38C (100.4F), Mild Pain (1 - 3)  acetaminophen     Tablet .. 975 milliGRAM(s) Oral every 6 hours PRN Moderate Pain (4 - 6)  aluminum hydroxide/magnesium hydroxide/simethicone Suspension 30 milliLiter(s) Oral every 4 hours PRN Dyspepsia  melatonin 3 milliGRAM(s) Oral at bedtime PRN Insomnia  ondansetron Injectable 4 milliGRAM(s) IV Push every 8 hours PRN Nausea and/or Vomiting      LABS                                                       10.3   5.77  )-----------( 190      ( 21 Apr 2024 06:17 )             30.8     04-21    142  |  109<H>  |  18  ----------------------------<  85  4.4   |  27  |  0.82    Ca    8.6      21 Apr 2024 06:17          CAPILLARY BLOOD GLUCOSE    LIVER FUNCTIONS - ( 19 Apr 2024 06:14 )  Alb: 2.8 g/dL / Pro: 6.3 g/dL / ALK PHOS: 94 U/L / ALT: 15 U/L / AST: 18 U/L / GGT: x           Urinalysis Basic - ( 20 Apr 2024 06:11 )    Color: x / Appearance: x / SG: x / pH: x  Gluc: 81 mg/dL / Ketone: x  / Bili: x / Urobili: x   Blood: x / Protein: x / Nitrite: x   Leuk Esterase: x / RBC: x / WBC x   Sq Epi: x / Non Sq Epi: x / Bacteria: x      CT HEAD  IMPRESSION:    Stable appearance of the brain.    No acute intracranial abnormalities.    Small vessel and atrophic changes.    Moderately advanced degenerative changes throughout cervical spine    No vertebral fracture, collapse or subluxation.    --- End of Report ---    WALDO CRUZ MD; Attending Radiologist  This document has been electronically signed. Apr 16 2024  2:50AM

## 2024-04-22 ENCOUNTER — TRANSCRIPTION ENCOUNTER (OUTPATIENT)
Age: 89
End: 2024-04-22

## 2024-04-22 VITALS
OXYGEN SATURATION: 95 % | SYSTOLIC BLOOD PRESSURE: 114 MMHG | DIASTOLIC BLOOD PRESSURE: 68 MMHG | RESPIRATION RATE: 16 BRPM | TEMPERATURE: 98 F | HEART RATE: 61 BPM

## 2024-04-22 PROCEDURE — 83605 ASSAY OF LACTIC ACID: CPT

## 2024-04-22 PROCEDURE — 73552 X-RAY EXAM OF FEMUR 2/>: CPT

## 2024-04-22 PROCEDURE — 71045 X-RAY EXAM CHEST 1 VIEW: CPT

## 2024-04-22 PROCEDURE — 86900 BLOOD TYPING SEROLOGIC ABO: CPT

## 2024-04-22 PROCEDURE — 72192 CT PELVIS W/O DYE: CPT | Mod: MC

## 2024-04-22 PROCEDURE — 36415 COLL VENOUS BLD VENIPUNCTURE: CPT

## 2024-04-22 PROCEDURE — 99239 HOSP IP/OBS DSCHRG MGMT >30: CPT | Mod: GC

## 2024-04-22 PROCEDURE — 80048 BASIC METABOLIC PNL TOTAL CA: CPT

## 2024-04-22 PROCEDURE — 99285 EMERGENCY DEPT VISIT HI MDM: CPT

## 2024-04-22 PROCEDURE — 97112 NEUROMUSCULAR REEDUCATION: CPT

## 2024-04-22 PROCEDURE — 85025 COMPLETE CBC W/AUTO DIFF WBC: CPT

## 2024-04-22 PROCEDURE — 73501 X-RAY EXAM HIP UNI 1 VIEW: CPT

## 2024-04-22 PROCEDURE — 83735 ASSAY OF MAGNESIUM: CPT

## 2024-04-22 PROCEDURE — 97535 SELF CARE MNGMENT TRAINING: CPT

## 2024-04-22 PROCEDURE — 85610 PROTHROMBIN TIME: CPT

## 2024-04-22 PROCEDURE — 86901 BLOOD TYPING SEROLOGIC RH(D): CPT

## 2024-04-22 PROCEDURE — 72125 CT NECK SPINE W/O DYE: CPT | Mod: MC

## 2024-04-22 PROCEDURE — 82550 ASSAY OF CK (CPK): CPT

## 2024-04-22 PROCEDURE — 85730 THROMBOPLASTIN TIME PARTIAL: CPT

## 2024-04-22 PROCEDURE — 76376 3D RENDER W/INTRP POSTPROCES: CPT

## 2024-04-22 PROCEDURE — 86850 RBC ANTIBODY SCREEN: CPT

## 2024-04-22 PROCEDURE — 82746 ASSAY OF FOLIC ACID SERUM: CPT

## 2024-04-22 PROCEDURE — 97116 GAIT TRAINING THERAPY: CPT

## 2024-04-22 PROCEDURE — 93306 TTE W/DOPPLER COMPLETE: CPT

## 2024-04-22 PROCEDURE — 70450 CT HEAD/BRAIN W/O DYE: CPT | Mod: MC

## 2024-04-22 PROCEDURE — 97162 PT EVAL MOD COMPLEX 30 MIN: CPT

## 2024-04-22 PROCEDURE — 96374 THER/PROPH/DIAG INJ IV PUSH: CPT

## 2024-04-22 PROCEDURE — 82607 VITAMIN B-12: CPT

## 2024-04-22 PROCEDURE — 81001 URINALYSIS AUTO W/SCOPE: CPT

## 2024-04-22 PROCEDURE — 84100 ASSAY OF PHOSPHORUS: CPT

## 2024-04-22 PROCEDURE — 0225U NFCT DS DNA&RNA 21 SARSCOV2: CPT

## 2024-04-22 PROCEDURE — 80053 COMPREHEN METABOLIC PANEL: CPT

## 2024-04-22 PROCEDURE — 85027 COMPLETE CBC AUTOMATED: CPT

## 2024-04-22 PROCEDURE — 84443 ASSAY THYROID STIM HORMONE: CPT

## 2024-04-22 PROCEDURE — 73562 X-RAY EXAM OF KNEE 3: CPT

## 2024-04-22 RX ORDER — LOSARTAN POTASSIUM 100 MG/1
100 TABLET, FILM COATED ORAL DAILY
Refills: 0 | Status: DISCONTINUED | OUTPATIENT
Start: 2024-04-22 | End: 2024-04-22

## 2024-04-22 RX ORDER — LIDOCAINE 4 G/100G
1 CREAM TOPICAL EVERY 24 HOURS
Refills: 0 | Status: DISCONTINUED | OUTPATIENT
Start: 2024-04-22 | End: 2024-04-22

## 2024-04-22 RX ORDER — IRBESARTAN 75 MG/1
300 TABLET ORAL
Qty: 0 | Refills: 0 | DISCHARGE
Start: 2024-04-22

## 2024-04-22 RX ADMIN — LOSARTAN POTASSIUM 100 MILLIGRAM(S): 100 TABLET, FILM COATED ORAL at 08:52

## 2024-04-22 RX ADMIN — Medication 1 APPLICATION(S): at 05:12

## 2024-04-22 RX ADMIN — CITALOPRAM 10 MILLIGRAM(S): 10 TABLET, FILM COATED ORAL at 13:16

## 2024-04-22 RX ADMIN — GABAPENTIN 300 MILLIGRAM(S): 400 CAPSULE ORAL at 05:12

## 2024-04-22 RX ADMIN — LIDOCAINE 1 PATCH: 4 CREAM TOPICAL at 08:00

## 2024-04-22 RX ADMIN — MEMANTINE HYDROCHLORIDE 5 MILLIGRAM(S): 10 TABLET ORAL at 05:12

## 2024-04-22 RX ADMIN — PANTOPRAZOLE SODIUM 40 MILLIGRAM(S): 20 TABLET, DELAYED RELEASE ORAL at 06:16

## 2024-04-22 RX ADMIN — ENOXAPARIN SODIUM 40 MILLIGRAM(S): 100 INJECTION SUBCUTANEOUS at 05:12

## 2024-04-22 RX ADMIN — LIDOCAINE 1 PATCH: 4 CREAM TOPICAL at 05:11

## 2024-04-22 RX ADMIN — Medication 25 MICROGRAM(S): at 05:12

## 2024-04-22 RX ADMIN — QUETIAPINE FUMARATE 25 MILLIGRAM(S): 200 TABLET, FILM COATED ORAL at 05:12

## 2024-04-22 RX ADMIN — Medication 1 MILLIGRAM(S): at 13:16

## 2024-04-22 NOTE — PROGRESS NOTE ADULT - PROVIDER SPECIALTY LIST ADULT
Family Medicine
Cardiology
Family Medicine

## 2024-04-22 NOTE — PROGRESS NOTE ADULT - ATTENDING COMMENTS
Please see resident note for full details regarding the service.     PE: A+Ox2, systolic ejection murmur, lungs CTA b/l, abd NT/ND, no lower extremity swelling, no FND     Assessment:   - Unwitnessed fall   - Severe aortic valve stenosis   - Mesenteric panniculitis and irregular contour of the uterus   - Pre-renal MAYA (improving)   - History of dementia with behavioural disturbance, CAD, HLD, HTN, hypothyroidism, PVC    Plan:   - HR improved after beta blocker stopped   - Normotensive, monitor off BP medications   - Family planning on outpatient TAVR   - Dispo: Medically optimized for [facility] -> awaiting placement     I spent a total of 45 minutes on the date of this encounter coordinating the patient's care. This includes reviewing results/imaging and discussions with specialists, nursing, case management/social work. Further tests, medications, and procedures have been ordered as indicated. Results and the plan of care were communicated to the patient and/or their family member. Supporting documentation was completed and added to the patient's chart.
Please see resident note for full details regarding the service.     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 This includes reviewing results/imaging and discussions with specialists, nursing, case management/social work. Further tests, medications, and procedures have been ordered as indicated. Results and the plan of care were communicated to the patient and/or their family member. Supporting documentation was completed and added to the patient's chart.
No acute evnts overnight. No new complaints.    Briefly 88y old female with PMH of dementia, CAD, HTN, HLD, hypothyroidism admitted s/p fall. Imaging with no acute fracutre/ trauma. seen by PT, recommended [facility]. Found to have severe AS.  T(C): 37.1 (04-20-24 @ 04:57), Max: 37.1 (04-20-24 @ 04:57)  T(F): 98.8 (04-20-24 @ 04:57), Max: 98.8 (04-20-24 @ 04:57)  HR: 81 (04-20-24 @ 04:57) (64 - 82)  BP: 131/67 (04-20-24 @ 04:57) (100/61 - 131/67)  ABP: --  ABP(mean): --  RR: 16 (04-20-24 @ 04:57) (16 - 18)  SpO2: 93% (04-20-24 @ 04:57) (93% - 98%)    on exam alert, awake, oriented, no apparent distress, both lungs clear, s1, s2, BABS +, abdomen- soft, no pedal edema, calf soft.    Home Medications:  amLODIPine 5 mg oral tablet: 1 tab(s) orally once a day (16 Apr 2024 02:34)  atorvastatin 20 mg oral tablet: 1 tab(s) orally once a day (16 Apr 2024 02:38)  citalopram 10 mg oral tablet: 1 tab(s) orally once a day (16 Apr 2024 02:34)  donepezil 5 mg oral tablet: 1 tab(s) orally once a day (16 Apr 2024 02:38)  folic acid 1 mg oral tablet: 1 tab(s) orally once a day (16 Apr 2024 02:35)  gabapentin 300 mg oral capsule: orally 2 times a day (16 Apr 2024 02:38)  irbesartan 300 mg oral tablet: 1 tab(s) orally once a day (16 Apr 2024 02:35)  levothyroxine 25 mcg (0.025 mg) oral tablet: 1 tab(s) orally once a day (16 Apr 2024 02:38)  Metoprolol Succinate ER 25 mg oral tablet, extended release: 1 tab(s) orally once a day (16 Apr 2024 02:38)  Namenda 5 mg oral tablet: 1 tab(s) orally 2 times a day (16 Apr 2024 02:36)  pantoprazole 40 mg oral delayed release tablet: 1 tab(s) orally once a day (16 Apr 2024 02:38)  Seroquel 25 mg oral tablet: 1 tab(s) orally 2 times a day (16 Apr 2024 02:37)    labs as needed    a/p:  # S/p Fall- no acute injury, seen by PT recommended [facility]. waiting for placement.  # MAYA- resolved  # Severe aortic stenosis- outpatient evaluation for TAVR as per daughter  Underlying history of CAD, HTN, HLD, hypothyroidism  - continue home meds for chronic conditions.  - rest as per resident note    disch dispo- medically stable for discharge to [facility] when bed becomes available.
87 y/o F with PMHx dementia with behavioural disturbance, CAD, HLD, HTN, hypothyroidism, PVC who is here s/p fall admitted for fall and syncope workup Plan: abnml echo, apprec cardio recs, apprec PT eval, monitor clinical course, dc planning once workup completed
Please see resident note for full details regarding the service.     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 This includes reviewing results/imaging and discussions with specialists, nursing, case management/social work. Further tests, medications, and procedures have been ordered as indicated. Results and the plan of care were communicated to the patient and/or their family member. Supporting documentation was completed and added to the patient's chart.
87 y/o F with PMHx dementia with behavioural disturbance, CAD, HLD, HTN, hypothyroidism, PVC who is here s/p fall admitted for fall and syncope workup Plan: abnml echo severe AS will need TAVR as outpt daughter agrees, apprec cardio recs, apprec PT eval, monitor clinical course, pt is medically optimized for [facility], apprec sw/cm dc planning
Please see resident note for full details regarding the service.     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 This includes reviewing results/imaging and discussions with specialists, nursing, case management/social work. Further tests, medications, and procedures have been ordered as indicated. Results and the plan of care were communicated to the patient and/or their family member. Supporting documentation was completed and added to the patient's chart.

## 2024-04-22 NOTE — PROGRESS NOTE ADULT - ASSESSMENT
87 y/o F with PMHx dementia with behavioural disturbance, CAD, HLD, HTN, hypothyroidism, PVC who is here s/p fall admitted for further work up.    # Unwitnessed fall    - EKG with T wave inversion V1, V2  - CT pelvis: Bilateral hip osteoarthritis. No fracture.   -Chest X ray: No acute pathology  -Pelvis Xray: No fracture  - Left femur  Xray: No fracture    - Left knee Xray: Severe degenerative narrowing of the medial joint compartment. Moderate suprapatellar joint effusion. No fracture  -ECHO with EF 55-60%, severe aortic valve stenosis  -RVP neg  -UA neg  -Creatine kinase neg   - Bacitracin for scalp laceration  - Lidocaine patch, acetaminophen for pain     - Fall precautions    -Cardiology recommendations appreciated, pt to follow up outpatient for severe aortic valve stenosis  -OT recommends [facility]  -PT recommends [facility]    #Severe Aortic Valve Stenosis   -ECHO with EF 55-60%, severe aortic valve stenosis  -May be symptomatic in setting of frequent falls, near syncope  -Cardiology following   -Cardio discussed TAVR with patient and daughter to do inpatient vs outpatient TAVR, will pursue outpatient     # Mesenteric panniculitis   - per CT   - outpatient  f/u    # Irregular lobular contour of the uterus  - per CT  - outpatient f/u       # MHx CAD  # MHx HLD  -- C/w statin     # MHx HTN   - BPs have been low   -dc amlodipine for now, ARB was put on hold given initial renal fxn   -dc metoprolol succinate 25 for now  -monitor BP closely ion setting of severe AS    # MHx dementia with behavioural disturbance   - c/w quetiapine  - c/w donepezil  - c/w memantin    #MHx hypothyroidism  - c/w synthroid  -TSH wnl    #MHx GERD  - c/w PPI    # DVT ppx    - Lovenox    Dispo: PT/OT recommend [facility], will de discharged to [facility]  today. Family to pursue severe aortic stenosis and possible TAVR outpatient.      Case dw Dr. Arnold

## 2024-04-22 NOTE — DISCHARGE NOTE NURSING/CASE MANAGEMENT/SOCIAL WORK - PATIENT PORTAL LINK FT
You can access the FollowMyHealth Patient Portal offered by [facility] by registering at the following website: [email or web address]. By joining [employer]’s FollowMyHealth portal, you will also be able to view your health information using other applications (apps) compatible with our system.

## 2024-05-06 ENCOUNTER — RX RENEWAL (OUTPATIENT)
Age: 89
End: 2024-05-06

## 2024-05-28 ENCOUNTER — RX RENEWAL (OUTPATIENT)
Age: 89
End: 2024-05-28

## 2024-07-26 NOTE — PATIENT PROFILE ADULT - PATIENT'S SEXUAL ORIENTATION
Breonna is here today for   Chief Complaint   Patient presents with    Follow-up     3 month follow up   .        Medication Refills needed today?  NO,   if you receive a prescription today would you like it to be sent to [facility]? NO    Medications: medications verified and updated    Patient would like communication of their results via:    Cell Phone:   Telephone Information:   Mobile [phone]     Okay to leave a message containing results? Yes    Tobacco history: verified         Health Maintenance       Shingles Vaccine (1 of 2)  Never done    Breast Cancer Screening (Every 2 Years)  Ordered on 4/26/2024    Diabetes Foot Exam (Yearly)  Overdue since 10/30/2019    COVID-19 Vaccine (4 - 2023-24 season)  Overdue since 9/1/2023    Diabetes Eye Exam (Yearly)  Due soon on 9/6/2024    Diabetes A1C (Every 6 Months)  Due soon on 9/22/2024           Following review of the above:  Patient is not proceeding with: Diabetes Eye Exam and COVID-19    Note: Refer to final orders and clinician documentation.            COVID-19 Vaccine Interest Assessment:   Not interested in receiving COVID-19 vaccine.  If the patient reports an outside immunization, please update the WIR/ICARE information in the Immunizations activity          Heterosexual

## 2024-11-05 ENCOUNTER — EMERGENCY (EMERGENCY)
Facility: HOSPITAL | Age: 89
LOS: 1 days | Discharge: HOME | End: 2024-11-05
Attending: STUDENT IN AN ORGANIZED HEALTH CARE EDUCATION/TRAINING PROGRAM | Admitting: STUDENT IN AN ORGANIZED HEALTH CARE EDUCATION/TRAINING PROGRAM
Payer: MEDICARE

## 2024-11-05 VITALS
HEART RATE: 68 BPM | RESPIRATION RATE: 18 BRPM | SYSTOLIC BLOOD PRESSURE: 115 MMHG | TEMPERATURE: 98 F | DIASTOLIC BLOOD PRESSURE: 73 MMHG | OXYGEN SATURATION: 98 % | WEIGHT: 169.98 LBS

## 2024-11-05 VITALS
RESPIRATION RATE: 17 BRPM | DIASTOLIC BLOOD PRESSURE: 67 MMHG | SYSTOLIC BLOOD PRESSURE: 119 MMHG | OXYGEN SATURATION: 98 % | TEMPERATURE: 98 F | HEART RATE: 73 BPM

## 2024-11-05 DIAGNOSIS — Z98.890 OTHER SPECIFIED POSTPROCEDURAL STATES: Chronic | ICD-10-CM

## 2024-11-05 PROBLEM — I10 ESSENTIAL (PRIMARY) HYPERTENSION: Chronic | Status: ACTIVE | Noted: 2024-04-16

## 2024-11-05 PROBLEM — I49.3 VENTRICULAR PREMATURE DEPOLARIZATION: Chronic | Status: ACTIVE | Noted: 2024-04-16

## 2024-11-05 PROBLEM — E78.5 HYPERLIPIDEMIA, UNSPECIFIED: Chronic | Status: ACTIVE | Noted: 2024-04-16

## 2024-11-05 PROBLEM — G30.9 ALZHEIMER'S DISEASE, UNSPECIFIED: Chronic | Status: ACTIVE | Noted: 2024-04-15

## 2024-11-05 PROBLEM — E03.9 HYPOTHYROIDISM, UNSPECIFIED: Chronic | Status: ACTIVE | Noted: 2024-04-16

## 2024-11-05 PROBLEM — I25.10 ATHEROSCLEROTIC HEART DISEASE OF NATIVE CORONARY ARTERY WITHOUT ANGINA PECTORIS: Chronic | Status: ACTIVE | Noted: 2024-04-16

## 2024-11-05 PROCEDURE — 73090 X-RAY EXAM OF FOREARM: CPT | Mod: 26,LT

## 2024-11-05 PROCEDURE — 73130 X-RAY EXAM OF HAND: CPT | Mod: 26,LT

## 2024-11-05 PROCEDURE — 99285 EMERGENCY DEPT VISIT HI MDM: CPT | Mod: 57

## 2024-11-05 PROCEDURE — 73080 X-RAY EXAM OF ELBOW: CPT | Mod: 26,LT

## 2024-11-05 PROCEDURE — 24670 CLTX ULNAR FX PROX W/O MNPJ: CPT | Mod: 54,LT

## 2024-11-05 PROCEDURE — 71045 X-RAY EXAM CHEST 1 VIEW: CPT | Mod: 26

## 2024-11-05 PROCEDURE — 73060 X-RAY EXAM OF HUMERUS: CPT | Mod: 26,LT

## 2024-11-05 RX ORDER — ACETAMINOPHEN 500 MG
650 TABLET ORAL ONCE
Refills: 0 | Status: COMPLETED | OUTPATIENT
Start: 2024-11-05 | End: 2024-11-05

## 2024-11-05 RX ADMIN — Medication 650 MILLIGRAM(S): at 15:43

## 2024-11-05 RX ADMIN — Medication 650 MILLIGRAM(S): at 15:13

## 2024-11-05 NOTE — ED PROVIDER NOTE - CARE PROVIDER_API CALL
Abdiaziz Santiago  Orthopaedic Surgery  [address] [place] [address], [facility] [address]  [place] [address]  Phone: [phone]  Fax: [phone]  Follow Up Time: 4-6 Days

## 2024-11-05 NOTE — ED PROVIDER NOTE - PHYSICAL EXAMINATION
CONSTITUTIONAL: NAD  SKIN: Warm dry  HEAD: NCAT  EYES: NL inspection  ENT: MMM  ABD: S/NT no R/G  EXT: +LT elbow nonttp, ROM active limited 2/2 pain, passive FULL, DP intact UE, no snuff box ttp, gen mild swell  NEURO: Grossly unremarkable  PSYCH: Cooperative, appropriate.

## 2024-11-05 NOTE — ED PROVIDER NOTE - OBJECTIVE STATEMENT
89 yo f ho dementia with behavioral disturbance, CAD, HLD, HTN, hypothyroidism pw Left elbow pain and swelling.  As per patient and daughter at bedside for collateral daughter noticed left elbow swelling 5 days ago.  Yesterday and x-ray was done at the nursing home and patient was found to have a left coronoid process fracture.  Brought to the ED today.  Patient denies any other pain numbness tingling weakness.  Still able to utilize her left hand but with pain.  As per daughter and from nursing home staff as per daughter no evidence of any falls.  Patient normally ambulates with a wheelchair and may have hit her arm on the wheelchair.  Patient otherwise is at her normal mental status baseline.

## 2024-11-05 NOTE — ED PROVIDER NOTE - CLINICAL SUMMARY MEDICAL DECISION MAKING FREE TEXT BOX
87 yo f ho dementia with behavioral disturbance, CAD, HLD, HTN, hypothyroidism pw Left elbow pain and swelling.  As per patient and daughter at bedside for collateral daughter noticed left elbow swelling 5 days ago.  Yesterday and x-ray was done at the nursing home and patient was found to have a left coronoid process fracture.  Brought to the ED today.  Patient denies any other pain numbness tingling weakness.  Still able to utilize her left hand but with pain.  As per daughter and from nursing home staff as per daughter no evidence of any falls.  Patient normally ambulates with a wheelchair and may have hit her arm on the wheelchair.  Patient otherwise is at her normal mental status baseline.  Exam as stated  X-rays completed and found to have a known coronoid.  Recommending just sling and process fracture.  No other acute findings.  Consulted orthopedic Dr. Santiago who recommended sling for comfort. Strict return precautions given  Patient to be discharged from ED. Any available test results were discussed with patient and/or family. Verbal instructions given, including instructions to return to ED immediately for any new, worsening, or concerning symptoms. Patient endorsed understanding. Written discharge instructions additionally given, including follow-up plan.

## 2024-11-05 NOTE — ED ADULT NURSE NOTE - OBJECTIVE STATEMENT
Patient brought to ED with Left elbow pain and swelling.  As per patient and daughter at bedside for collateral daughter noticed left elbow swelling 5 days ago.  Patient awake and follows simple commands. No nausea, vomiting, dizziness or SOB.

## 2024-11-05 NOTE — ED ADULT NURSE NOTE - NSFALLHARMRISKINTERV_ED_ALL_ED

## 2024-11-05 NOTE — ED PROVIDER NOTE - NSFOLLOWUPINSTRUCTIONS_ED_ALL_ED_FT
Elbow Fracture    WHAT YOU NEED TO KNOW:    An elbow fracture is a break in one or more of the 3 bones that form your elbow joint. Osteoporosis (brittle bones) can increase your risk for an elbow fracture.    DISCHARGE INSTRUCTIONS:  Return to the emergency department if:   Your skin becomes swollen, cold, or pale.  Your elbow, hand, or fingers are numb.    Call your doctor if:   You have a fever.  The pain gets worse, even after you rest and take your medicine.  You have new or more trouble moving your arm.  You have new sores around the area of your brace, splint, or cast.  Your brace, splint, or cast becomes damaged.  You have questions or concerns about your condition or care.    Medicines: You may need any of the following:     Prescription pain medicine may be given. Ask your healthcare provider how to take this medicine safely. Some prescription pain medicines contain acetaminophen. Do not take other medicines that contain acetaminophen without talking to your healthcare provider. Too much acetaminophen may cause liver damage. Prescription pain medicine may cause constipation. Ask your healthcare provider how to prevent or treat constipation.     NSAIDs, such as ibuprofen, help decrease swelling, pain, and fever. This medicine is available with or without a doctor's order. NSAIDs can cause stomach bleeding or kidney problems in certain people. If you take blood thinner medicine, always ask your healthcare provider if NSAIDs are safe for you. Always read the medicine label and follow directions.    Take your medicine as directed. Contact your healthcare provider if you think your medicine is not helping or if you have side effects. Tell him or her if you are allergic to any medicine. Keep a list of the medicines, vitamins, and herbs you take. Include the amounts, and when and why you take them. Bring the list or the pill bottles to follow-up visits. Carry your medicine list with you in case of an emergency.    Self-care:     Elevate your elbow above the level of your heart as often as you can. This will help decrease swelling and pain. Prop your elbow on pillows or blankets to keep it elevated comfortably. While your elbow is elevated, wiggle your fingers and open and close them to prevent hand stiffness.    Apply ice on your elbow on your elbow for 15 to 20 minutes every hour or as directed. Use an ice pack, or put crushed ice in a plastic bag. Cover it with a towel. Ice helps prevent tissue damage and decreases swelling and pain.    Go to physical therapy as directed. A physical therapist can teach you exercises to help improve movement and strength and to decrease pain.    Care for your brace, cast, or splint: Follow instructions about when you may take a bath or shower. It is important not to get your brace, cast, or splint wet. Cover your device with a plastic bag before you bathe. Tape the bag to your skin above the device to help keep out water. Hold your elbow away from the water in case the bag breaks.    Check the skin around your cast, brace, or splint daily for any redness or open skin.    Do not use a sharp or pointed object to scratch your skin under the cast, brace, or splint.    Do not remove your brace or splint unless directed.    Follow up with your doctor as directed: You may need to see a specialist. You may need more x-rays and treatment. Write down your questions so you remember to ask them during your visits.

## 2024-11-05 NOTE — ED ADULT NURSE NOTE - CHIEF COMPLAINT QUOTE
pt axo1 at baseline, BIBA from [facility] for L elbow swelling. L coronoid fracture confirmed on XR last night. splint applied by EMS today. unknown fall, unknown etiology of fracture per [facility]. on [facility]SA.

## 2024-11-05 NOTE — ED ADULT TRIAGE NOTE - CHIEF COMPLAINT QUOTE
pt axo1 at baseline, BIB[facility] from [facility] for L elbow swelling. L coronoid fracture confirmed on XR last night. splint applied by EMS today. unknown fall, unknown etiology of fracture per [facility]. pt axo1 at baseline, [facility]IB[facility] from [facility] for L elbow swelling. L coronoid fracture confirmed on XR last night. splint applied by EMS today. unknown fall, unknown etiology of fracture per [facility]. on [facility].

## 2024-11-20 PROCEDURE — 71045 X-RAY EXAM CHEST 1 VIEW: CPT

## 2024-11-20 PROCEDURE — 73080 X-RAY EXAM OF ELBOW: CPT

## 2024-11-20 PROCEDURE — 73060 X-RAY EXAM OF HUMERUS: CPT

## 2024-11-20 PROCEDURE — 73130 X-RAY EXAM OF HAND: CPT

## 2024-11-20 PROCEDURE — 24670 CLTX ULNAR FX PROX W/O MNPJ: CPT | Mod: LT

## 2024-11-20 PROCEDURE — 73090 X-RAY EXAM OF FOREARM: CPT

## 2024-11-20 PROCEDURE — 99284 EMERGENCY DEPT VISIT MOD MDM: CPT | Mod: 25
